# Patient Record
Sex: FEMALE | Race: WHITE | Employment: OTHER | RURAL
[De-identification: names, ages, dates, MRNs, and addresses within clinical notes are randomized per-mention and may not be internally consistent; named-entity substitution may affect disease eponyms.]

---

## 2021-08-08 ENCOUNTER — APPOINTMENT (OUTPATIENT)
Dept: GENERAL RADIOLOGY | Age: 71
DRG: 064 | End: 2021-08-08
Attending: INTERNAL MEDICINE
Payer: MEDICARE

## 2021-08-08 ENCOUNTER — APPOINTMENT (OUTPATIENT)
Dept: CT IMAGING | Age: 71
DRG: 064 | End: 2021-08-08
Attending: EMERGENCY MEDICINE
Payer: MEDICARE

## 2021-08-08 ENCOUNTER — HOSPITAL ENCOUNTER (INPATIENT)
Age: 71
LOS: 2 days | Discharge: HOME | DRG: 064 | End: 2021-08-12
Attending: EMERGENCY MEDICINE | Admitting: INTERNAL MEDICINE
Payer: MEDICARE

## 2021-08-08 DIAGNOSIS — U07.1 COVID-19 VIRUS INFECTION: ICD-10-CM

## 2021-08-08 DIAGNOSIS — E78.5 DYSLIPIDEMIA: ICD-10-CM

## 2021-08-08 DIAGNOSIS — I63.312 CEREBROVASCULAR ACCIDENT (CVA) DUE TO THROMBOSIS OF LEFT MIDDLE CEREBRAL ARTERY (HCC): ICD-10-CM

## 2021-08-08 DIAGNOSIS — G45.9 TIA (TRANSIENT ISCHEMIC ATTACK): Primary | ICD-10-CM

## 2021-08-08 DIAGNOSIS — F17.200 SMOKING: ICD-10-CM

## 2021-08-08 LAB
ALBUMIN SERPL-MCNC: 3.2 G/DL (ref 3.5–5)
ALBUMIN/GLOB SERPL: 0.7 {RATIO} (ref 1.1–2.2)
ALP SERPL-CCNC: 106 U/L (ref 45–117)
ALT SERPL-CCNC: 32 U/L (ref 12–78)
ANION GAP SERPL CALC-SCNC: 10 MMOL/L (ref 5–15)
AST SERPL-CCNC: 37 U/L (ref 15–37)
ATRIAL RATE: 74 BPM
BASOPHILS # BLD: 0.1 K/UL (ref 0–0.1)
BASOPHILS NFR BLD: 1 % (ref 0–1)
BILIRUB SERPL-MCNC: 0.8 MG/DL (ref 0.2–1)
BUN SERPL-MCNC: 15 MG/DL (ref 6–20)
BUN/CREAT SERPL: 21 (ref 12–20)
CALCIUM SERPL-MCNC: 9 MG/DL (ref 8.5–10.1)
CALCULATED P AXIS, ECG09: 65 DEGREES
CALCULATED R AXIS, ECG10: -12 DEGREES
CALCULATED T AXIS, ECG11: 53 DEGREES
CHLORIDE SERPL-SCNC: 101 MMOL/L (ref 97–108)
CO2 SERPL-SCNC: 23 MMOL/L (ref 21–32)
CREAT SERPL-MCNC: 0.73 MG/DL (ref 0.55–1.02)
D DIMER PPP FEU-MCNC: 11.47 MG/L FEU (ref 0–0.65)
DIAGNOSIS, 93000: NORMAL
DIFFERENTIAL METHOD BLD: ABNORMAL
EOSINOPHIL # BLD: 0.1 K/UL (ref 0–0.4)
EOSINOPHIL NFR BLD: 1 % (ref 0–7)
ERYTHROCYTE [DISTWIDTH] IN BLOOD BY AUTOMATED COUNT: 12.4 % (ref 11.5–14.5)
FLUAV RNA SPEC QL NAA+PROBE: NOT DETECTED
FLUBV RNA SPEC QL NAA+PROBE: NOT DETECTED
GLOBULIN SER CALC-MCNC: 4.5 G/DL (ref 2–4)
GLUCOSE BLD STRIP.AUTO-MCNC: 109 MG/DL (ref 65–117)
GLUCOSE SERPL-MCNC: 107 MG/DL (ref 65–100)
HCT VFR BLD AUTO: 46 % (ref 35–47)
HGB BLD-MCNC: 16.4 G/DL (ref 11.5–16)
IMM GRANULOCYTES # BLD AUTO: 0 K/UL (ref 0–0.04)
IMM GRANULOCYTES NFR BLD AUTO: 0 % (ref 0–0.5)
INR PPP: 1.1 (ref 0.9–1.1)
LYMPHOCYTES # BLD: 1.8 K/UL (ref 0.8–3.5)
LYMPHOCYTES NFR BLD: 27 % (ref 12–49)
MCH RBC QN AUTO: 33.2 PG (ref 26–34)
MCHC RBC AUTO-ENTMCNC: 35.7 G/DL (ref 30–36.5)
MCV RBC AUTO: 93.1 FL (ref 80–99)
MONOCYTES # BLD: 0.9 K/UL (ref 0–1)
MONOCYTES NFR BLD: 13 % (ref 5–13)
NEUTS SEG # BLD: 4 K/UL (ref 1.8–8)
NEUTS SEG NFR BLD: 58 % (ref 32–75)
NRBC # BLD: 0 K/UL (ref 0–0.01)
NRBC BLD-RTO: 0 PER 100 WBC
P-R INTERVAL, ECG05: 170 MS
PLATELET # BLD AUTO: 288 K/UL (ref 150–400)
PMV BLD AUTO: 9.4 FL (ref 8.9–12.9)
POTASSIUM SERPL-SCNC: 4 MMOL/L (ref 3.5–5.1)
PROT SERPL-MCNC: 7.7 G/DL (ref 6.4–8.2)
PROTHROMBIN TIME: 10.9 SEC (ref 9–11.1)
Q-T INTERVAL, ECG07: 392 MS
QRS DURATION, ECG06: 84 MS
QTC CALCULATION (BEZET), ECG08: 435 MS
RBC # BLD AUTO: 4.94 M/UL (ref 3.8–5.2)
SARS-COV-2, COV2: DETECTED
SERVICE CMNT-IMP: NORMAL
SODIUM SERPL-SCNC: 134 MMOL/L (ref 136–145)
TROPONIN I SERPL-MCNC: <0.05 NG/ML
VENTRICULAR RATE, ECG03: 74 BPM
WBC # BLD AUTO: 6.8 K/UL (ref 3.6–11)

## 2021-08-08 PROCEDURE — 80053 COMPREHEN METABOLIC PANEL: CPT

## 2021-08-08 PROCEDURE — 85025 COMPLETE CBC W/AUTO DIFF WBC: CPT

## 2021-08-08 PROCEDURE — 71045 X-RAY EXAM CHEST 1 VIEW: CPT

## 2021-08-08 PROCEDURE — 99218 HC RM OBSERVATION: CPT

## 2021-08-08 PROCEDURE — 82962 GLUCOSE BLOOD TEST: CPT

## 2021-08-08 PROCEDURE — 36415 COLL VENOUS BLD VENIPUNCTURE: CPT

## 2021-08-08 PROCEDURE — 85379 FIBRIN DEGRADATION QUANT: CPT

## 2021-08-08 PROCEDURE — 87636 SARSCOV2 & INF A&B AMP PRB: CPT

## 2021-08-08 PROCEDURE — 96372 THER/PROPH/DIAG INJ SC/IM: CPT

## 2021-08-08 PROCEDURE — 74011250637 HC RX REV CODE- 250/637: Performed by: EMERGENCY MEDICINE

## 2021-08-08 PROCEDURE — 99285 EMERGENCY DEPT VISIT HI MDM: CPT

## 2021-08-08 PROCEDURE — 84484 ASSAY OF TROPONIN QUANT: CPT

## 2021-08-08 PROCEDURE — 70450 CT HEAD/BRAIN W/O DYE: CPT

## 2021-08-08 PROCEDURE — 74011250636 HC RX REV CODE- 250/636: Performed by: INTERNAL MEDICINE

## 2021-08-08 PROCEDURE — 93005 ELECTROCARDIOGRAM TRACING: CPT

## 2021-08-08 PROCEDURE — 85610 PROTHROMBIN TIME: CPT

## 2021-08-08 RX ORDER — ENOXAPARIN SODIUM 100 MG/ML
40 INJECTION SUBCUTANEOUS EVERY 12 HOURS
Status: DISCONTINUED | OUTPATIENT
Start: 2021-08-08 | End: 2021-08-12 | Stop reason: HOSPADM

## 2021-08-08 RX ORDER — ONDANSETRON 2 MG/ML
4 INJECTION INTRAMUSCULAR; INTRAVENOUS
Status: DISCONTINUED | OUTPATIENT
Start: 2021-08-08 | End: 2021-08-12 | Stop reason: HOSPADM

## 2021-08-08 RX ORDER — GUAIFENESIN 100 MG/5ML
325 LIQUID (ML) ORAL
Status: COMPLETED | OUTPATIENT
Start: 2021-08-08 | End: 2021-08-08

## 2021-08-08 RX ORDER — ACETAMINOPHEN 325 MG/1
650 TABLET ORAL
Status: DISCONTINUED | OUTPATIENT
Start: 2021-08-08 | End: 2021-08-12 | Stop reason: HOSPADM

## 2021-08-08 RX ADMIN — ASPIRIN 324 MG: 81 TABLET, CHEWABLE ORAL at 17:04

## 2021-08-08 RX ADMIN — ENOXAPARIN SODIUM 40 MG: 40 INJECTION SUBCUTANEOUS at 19:49

## 2021-08-08 NOTE — ED PROVIDER NOTES
[address] [facility]  EMERGENCY DEPARTMENT HISTORY AND PHYSICAL EXAM         Date of Service: 8/8/2021   Patient Name: Wali Lu   YOB: 1950  Medical Record Number: [id number]    History of Presenting Illness     Chief Complaint   Patient presents with    Extremity Weakness        History Provided By:  patient    Additional History:   Wali Lu is a 73 y.o. female who presents via EMS to the ED with cc of right side weakness that began about 3 hours PTA, lasted about 20 minutes, and has now resolved. She denies headache, difficulty speaking, facial droop. No H/O stroke. She denies CP, palpitations, SOB. She has been coughing, but has no F/C. She notes that all of her household members have [employer] and she is not vaccinated. There are no other complaints, changes or physical findings at this time. Primary Care Provider: None   Specialist:    Past History     Past Medical History:   History reviewed. No pertinent past medical history. Past Surgical History:   History reviewed. No pertinent surgical history. Family History:   History reviewed. No pertinent family history. Social History:   Social History     Tobacco Use    Smoking status: Not on file   Substance Use Topics    Alcohol use: Not on file    Drug use: Not on file        Allergies:   No Known Allergies     Review of Systems   Review of Systems   Constitutional: Negative for appetite change, chills and fever. HENT: Negative for congestion. Eyes: Negative for visual disturbance. Respiratory: Positive for cough. Negative for shortness of breath and wheezing. Cardiovascular: Negative for chest pain, palpitations and leg swelling. Gastrointestinal: Negative for abdominal pain. Genitourinary: Negative for dysuria, frequency and urgency. Musculoskeletal: Negative for back pain, joint swelling, myalgias and neck stiffness. Skin: Negative for rash. Neurological: Positive for weakness. Negative for dizziness, syncope, facial asymmetry, speech difficulty, numbness and headaches. Hematological: Negative for adenopathy. Psychiatric/Behavioral: Negative for behavioral problems and dysphoric mood. Physical Exam  Physical Exam  Vitals and nursing note reviewed. Constitutional:       General: She is not in acute distress. Appearance: She is well-developed. HENT:      Head: Normocephalic and atraumatic. Eyes:      General: No scleral icterus. Conjunctiva/sclera: Conjunctivae normal.      Pupils: Pupils are equal, round, and reactive to light. Cardiovascular:      Rate and Rhythm: Normal rate and regular rhythm. Heart sounds: No murmur heard. No gallop. Pulmonary:      Effort: Pulmonary effort is normal. No respiratory distress. Breath sounds: No stridor. No wheezing or rales. Abdominal:      General: Bowel sounds are normal. There is no distension. Palpations: Abdomen is soft. There is no mass. Tenderness: There is no abdominal tenderness. There is no guarding or rebound. Musculoskeletal:         General: Normal range of motion. Cervical back: Normal range of motion and neck supple. Lymphadenopathy:      Cervical: No cervical adenopathy. Skin:     General: Skin is warm and dry. Findings: No erythema or rash. Neurological:      General: No focal deficit present. Mental Status: She is alert and oriented to person, place, and time. Cranial Nerves: No cranial nerve deficit. Sensory: No sensory deficit. Motor: No weakness. Coordination: Coordination normal.      Comments: Pt has an entirely non focal neuro exam.          Medical Decision Making   I am the first provider for this patient. I reviewed the vital signs, available nursing notes, past medical history, past surgical history, family history and social history.      Old Medical Records: none     Provider Notes:   DDX: TIA, CVA, bleed, [facility]     ED Course:  5:50 PM   Initial assessment performed. The patients presenting problems have been discussed, and they are in agreement with the care plan formulated and outlined with them. I have encouraged them to ask questions as they arise throughout their visit. Progress Notes:  6:35 PM  Unremarkable workup, with negative CT head. COVID positive. Will admit for new onset TIA. Pb Baker., MD      Procedures:   Procedures    Diagnostic Study Results   Labs -      Recent Results (from the past 12 hour(s))   CBC WITH AUTOMATED DIFF    Collection Time: 08/08/21  4:47 PM   Result Value Ref Range    WBC 6.8 3.6 - 11.0 K/uL    RBC 4.94 3.80 - 5.20 M/uL    HGB 16.4 (H) 11.5 - 16.0 g/dL    HCT 46.0 35.0 - 47.0 %    MCV 93.1 80.0 - 99.0 FL    MCH 33.2 26.0 - 34.0 PG    MCHC 35.7 30.0 - 36.5 g/dL    RDW 12.4 11.5 - 14.5 %    PLATELET 357 609 - 247 K/uL    MPV 9.4 8.9 - 12.9 FL    NRBC 0.0 0  WBC    ABSOLUTE NRBC 0.00 0.00 - 0.01 K/uL    NEUTROPHILS 58 32 - 75 %    LYMPHOCYTES 27 12 - 49 %    MONOCYTES 13 5 - 13 %    EOSINOPHILS 1 0 - 7 %    BASOPHILS 1 0 - 1 %    IMMATURE GRANULOCYTES 0 0.0 - 0.5 %    ABS. NEUTROPHILS 4.0 1.8 - 8.0 K/UL    ABS. LYMPHOCYTES 1.8 0.8 - 3.5 K/UL    ABS. MONOCYTES 0.9 0.0 - 1.0 K/UL    ABS. EOSINOPHILS 0.1 0.0 - 0.4 K/UL    ABS. BASOPHILS 0.1 0.0 - 0.1 K/UL    ABS. IMM.  GRANS. 0.0 0.00 - 0.04 K/UL    DF AUTOMATED     METABOLIC PANEL, COMPREHENSIVE    Collection Time: 08/08/21  4:47 PM   Result Value Ref Range    Sodium 134 (L) 136 - 145 mmol/L    Potassium 4.0 3.5 - 5.1 mmol/L    Chloride 101 97 - 108 mmol/L    CO2 23 21 - 32 mmol/L    Anion gap 10 5 - 15 mmol/L    Glucose 107 (H) 65 - 100 mg/dL    BUN 15 6 - 20 MG/DL    Creatinine 0.73 0.55 - 1.02 MG/DL    BUN/Creatinine ratio 21 (H) 12 - 20      GFR est AA >60 >60 ml/min/1.73m2    GFR est non-AA >60 >60 ml/min/1.73m2    Calcium 9.0 8.5 - 10.1 MG/DL    Bilirubin, total 0.8 0.2 - 1.0 MG/DL    ALT (SGPT) 32 12 - 78 U/L    AST (SGOT) 37 15 - 37 U/L    Alk. phosphatase 106 45 - 117 U/L    Protein, total 7.7 6.4 - 8.2 g/dL    Albumin 3.2 (L) 3.5 - 5.0 g/dL    Globulin 4.5 (H) 2.0 - 4.0 g/dL    A-G Ratio 0.7 (L) 1.1 - 2.2     PROTHROMBIN TIME + INR    Collection Time: 08/08/21  4:47 PM   Result Value Ref Range    INR 1.1 0.9 - 1.1      Prothrombin time 10.9 9.0 - 11.1 sec   TROPONIN I    Collection Time: 08/08/21  4:47 PM   Result Value Ref Range    Troponin-I, Qt. <0.05 <0.05 ng/mL   EKG, 12 LEAD, INITIAL    Collection Time: 08/08/21  4:59 PM   Result Value Ref Range    Ventricular Rate 74 BPM    Atrial Rate 74 BPM    P-R Interval 170 ms    QRS Duration 84 ms    Q-T Interval 392 ms    QTC Calculation (Bezet) 435 ms    Calculated P Axis 65 degrees    Calculated R Axis -12 degrees    Calculated T Axis 53 degrees    Diagnosis       Normal sinus rhythm  Normal ECG  No previous ECGs available     GLUCOSE, POC    Collection Time: 08/08/21  5:01 PM   Result Value Ref Range    Glucose (POC) 109 65 - 117 mg/dL    Performed by Iveth Freed RN    COVID-19 WITH INFLUENZA A/B    Collection Time: 08/08/21  5:17 PM   Result Value Ref Range    SARS-CoV-2 Detected (AA) NOTD      Influenza A by PCR Not detected NOTD      Influenza B by PCR Not detected NOTD         Radiologic Studies -  The following have been ordered and reviewed:  CT HEAD WO CONT   Final Result   No acute intracranial abnormality. CT Results  (Last 48 hours)               08/08/21 1759  CT HEAD WO CONT Final result    Impression:  No acute intracranial abnormality. Narrative:  HEAD CT WITHOUT CONTRAST: 8/8/2021 5:59 PM       INDICATION: Right-sided weakness, now resolved. COMPARISON: None. PROCEDURE: Axial images of the head were obtained without contrast. Coronal and   sagittal reformats were performed. CT dose reduction was achieved through use of   a standardized protocol tailored for this examination and automatic exposure   control for dose modulation. FINDINGS: The ventricles and sulci are appropriate in size and configuration for   age. No loss of gray-white differentiation to suggest late acute or early   subacute infarction. No mass effect or intracranial hemorrhage. There are air-fluid levels in the left maxillary, left ethmoid, and bilateral   sphenoid sinuses. CXR Results  (Last 48 hours)    None            Vital Signs-Reviewed the patient's vital signs. Patient Vitals for the past 12 hrs:   Temp Pulse Resp BP SpO2   08/08/21 1635 99 °F (37.2 °C) 80 18 (!) 144/75 95 %     Medications Given in the ED:  Medications   aspirin chewable tablet 324 mg (324 mg Oral Given 8/8/21 1704)       Diagnosis:  Clinical Impression:   1. TIA (transient ischemic attack)    2. COVID-19 virus infection         Disposition:  Home  _______________________________   Attestations: This note was performed by Candi Doss MD in its entirety.   _______________________________

## 2021-08-08 NOTE — ED NOTES
Verbal shift change report given to Hawk Arceo RN (oncoming nurse) by Lou Martines RN (offgoing nurse).  Report included the following information SBAR, ED Summary, MAR, Med Rec Status and Cardiac Rhythm Sr.

## 2021-08-08 NOTE — H&P
[facility]   Admission History & Physical        2021 7:06 PM  Patient: Elizabeth Olvera 1950  PCP: None    HISTORY  Chief Complaint:   Chief Complaint   Patient presents with    Extremity Weakness       HPI: 73 y.o. female presenting for admission to [facility] for further evaluation and treatment for TIA (transient ischemic attack). She  has no past medical history on file. . She reported the onset of R UE/LE weakness 3 hours PTA lasting about 20 minutes getting up from bed where she was resting watching a movie. She notes her hand dropped, legs were week and she fell to ground. Has h/o Cigs / HTN / HDL. No h/o DM. Pt is a retired . No prior h/o vascular disease. No significant COPD    Moved to [facility] in March to be close to her sister (from [place]). Nephew came up from [facility] for his birthday and became ill and was later dx with COVID. His father was admitted earlier with Covid Pneum. Pt has been ill with cough, weakness, anorexia, no taste, extreme weakness. Pt denies any focal weakness until this afternoon PTA. She has note some cough, but no SOB / wheezing. Has never required bronchodilator or oxygen. ED w/u was noted for neg CT HEAD. Pt was admitted to [facility] for Vascular w/u     Past Medical History:  HTN  HDL    Past Surgical History:  None      Medication: (has not taken the past week due to illness)  Norvasc 5mg daily  Pravachol 20mg daily    Allergies:  No Known Allergies    Social History:  Cigarette:   1ppd x 40yrs, none the past week  EtOH:  Wine daily, none the past week    Family History:  History reviewed. No pertinent family history.     ROS:  Total of 12 systems reviewed as follows:  POSITIVE= bolded text  Negative = text not bolded       General:  fever, chills, sweats, generalized weakness, weight loss/gain, loss of appetite   Eyes:    blurred vision, eye pain, loss of vision, double vision  ENT:    rhinorrhea, pharyngitis   Respiratory:  cough, sputum production, SOB, INGRAM, wheezing, pleuritic pain   Cardiology:   chest pain, palpitations, orthopnea, PND, edema, syncope   Gastrointestinal:  abdominal pain , N/V, diarrhea, dysphagia, constipation, bleeding   Genitourinary:  frequency, urgency, dysuria, hematuria, incontinence, prostatism   Muskuloskeletal: arthralgia, myalgia, back pain  Hematology:   easy bruising, nose or gum bleeding, lymphadenopathy   Dermatological: rash, ulceration, pruritis, color change / jaundice  Endocrine:   hot flashes or polydipsia   Neurological:  headache, dizziness, confusion, focal weakness, paresthesia, speech difficulties, memory loss, gait difficulty  Psychological: feelings of anxiety, depression, agitation      PHYSICAL EXAM:  Patient Vitals for the past 24 hrs:   Temp Pulse Resp BP SpO2   08/08/21 1840  73 18 121/71 93 %   08/08/21 1741  75 18 (!) 173/87 93 %   08/08/21 1635 99 °F (37.2 °C) 80 18 (!) 144/75 95 %       General:    Alert, cooperative, no distress, appears stated age. HEENT: Atraumatic, anicteric sclerae, pink conjunctivae     No oral ulcers, mucosa moist, throat clear, dentition fair  Neck:  Supple, symmetrical;   thyroid non tender  Lungs:   Clear to auscultation bilaterally. No wheezing or rhonchi. No rales. Chest wall:  No tenderness. No accessory muscle use. Heart:   Regular rhythm. No  murmur. No edema  Abdomen:   Soft, non-tender. Not distended. Bowel sounds normal  Extremities: No cyanosis. No clubbing      Capillary refill normal,  Radial pulse 2+,  DP 1+  Skin:     Not pale. Not jaundiced. No rashes   Psych:  Not depressed. Not anxious or agitated. Neurologic: EOMs intact. No facial asymmetry. No aphasia or slurred speech. Mild weakness R  4+/5 w/o drift, Sensation grossly intact. Alert and oriented X 4.     Lab Data Reviewed:    Recent Results (from the past 24 hour(s))   CBC WITH AUTOMATED DIFF    Collection Time: 08/08/21  4:47 PM   Result Value Ref Range    WBC 6.8 3.6 - 11.0 K/uL    RBC 4.94 3.80 - 5.20 M/uL    HGB 16.4 (H) 11.5 - 16.0 g/dL    HCT 46.0 35.0 - 47.0 %    MCV 93.1 80.0 - 99.0 FL    MCH 33.2 26.0 - 34.0 PG    MCHC 35.7 30.0 - 36.5 g/dL    RDW 12.4 11.5 - 14.5 %    PLATELET 561 984 - 472 K/uL    MPV 9.4 8.9 - 12.9 FL    NRBC 0.0 0  WBC    ABSOLUTE NRBC 0.00 0.00 - 0.01 K/uL    NEUTROPHILS 58 32 - 75 %    LYMPHOCYTES 27 12 - 49 %    MONOCYTES 13 5 - 13 %    EOSINOPHILS 1 0 - 7 %    BASOPHILS 1 0 - 1 %    IMMATURE GRANULOCYTES 0 0.0 - 0.5 %    ABS. NEUTROPHILS 4.0 1.8 - 8.0 K/UL    ABS. LYMPHOCYTES 1.8 0.8 - 3.5 K/UL    ABS. MONOCYTES 0.9 0.0 - 1.0 K/UL    ABS. EOSINOPHILS 0.1 0.0 - 0.4 K/UL    ABS. BASOPHILS 0.1 0.0 - 0.1 K/UL    ABS. IMM. GRANS. 0.0 0.00 - 0.04 K/UL    DF AUTOMATED     METABOLIC PANEL, COMPREHENSIVE    Collection Time: 08/08/21  4:47 PM   Result Value Ref Range    Sodium 134 (L) 136 - 145 mmol/L    Potassium 4.0 3.5 - 5.1 mmol/L    Chloride 101 97 - 108 mmol/L    CO2 23 21 - 32 mmol/L    Anion gap 10 5 - 15 mmol/L    Glucose 107 (H) 65 - 100 mg/dL    BUN 15 6 - 20 MG/DL    Creatinine 0.73 0.55 - 1.02 MG/DL    BUN/Creatinine ratio 21 (H) 12 - 20      GFR est AA >60 >60 ml/min/1.73m2    GFR est non-AA >60 >60 ml/min/1.73m2    Calcium 9.0 8.5 - 10.1 MG/DL    Bilirubin, total 0.8 0.2 - 1.0 MG/DL    ALT (SGPT) 32 12 - 78 U/L    AST (SGOT) 37 15 - 37 U/L    Alk.  phosphatase 106 45 - 117 U/L    Protein, total 7.7 6.4 - 8.2 g/dL    Albumin 3.2 (L) 3.5 - 5.0 g/dL    Globulin 4.5 (H) 2.0 - 4.0 g/dL    A-G Ratio 0.7 (L) 1.1 - 2.2     PROTHROMBIN TIME + INR    Collection Time: 08/08/21  4:47 PM   Result Value Ref Range    INR 1.1 0.9 - 1.1      Prothrombin time 10.9 9.0 - 11.1 sec   TROPONIN I    Collection Time: 08/08/21  4:47 PM   Result Value Ref Range    Troponin-I, Qt. <0.05 <0.05 ng/mL   EKG, 12 LEAD, INITIAL    Collection Time: 08/08/21  4:59 PM   Result Value Ref Range    Ventricular Rate 74 BPM    Atrial Rate 74 BPM    P-R Interval 170 ms QRS Duration 84 ms    Q-T Interval 392 ms    QTC Calculation (Bezet) 435 ms    Calculated P Axis 65 degrees    Calculated R Axis -12 degrees    Calculated T Axis 53 degrees    Diagnosis       Normal sinus rhythm  Normal ECG  No previous ECGs available     GLUCOSE, POC    Collection Time: 08/08/21  5:01 PM   Result Value Ref Range    Glucose (POC) 109 65 - 117 mg/dL    Performed by Pamela Hadley RN    COVID-19 WITH INFLUENZA A/B    Collection Time: 08/08/21  5:17 PM   Result Value Ref Range    SARS-CoV-2 Detected (AA) NOTD      Influenza A by PCR Not detected NOTD      Influenza B by PCR Not detected NOTD         EKG: NSR 74 bpm, Normal    Radiology:  CXR 8/9:  Heterogeneous airspace opacities bilaterally. No pleural effusion or  pneumothorax. Heart size and mediastinal contours are normal. Osseous structures  are within normal limits.   IMPRESSION : Heterogeneous bilateral airspace opacities in keeping with COVID 19 infection. CT HEAD 8/8:  The ventricles and sulci are appropriate in size and configuration for  age. No loss of gray-white differentiation to suggest late acute or early  subacute infarction. No mass effect or intracranial hemorrhage.   There are air-fluid levels in the left maxillary, left ethmoid, and bilateral sphenoid sinuses.   IMPRESSION:  No acute intracranial abnormality. MRI BRAIN 8/9:  There are multiple small acute infarcts in the left frontal and parietal lobes,  including in the parasagittal posterior left frontal lobe, involving both the  precentral and postcentral gyri, and in the left lateral frontal lobe.   The ventricles are normal in size and position. There are scattered  periventricular and deep white matter T2/FLAIR hyperintensities, consistent with  mild chronic microvascular ischemic disease. There is no acute hemorrhage,  extra-axial fluid collection, or mass effect. There is no cerebellar tonsillar  herniation.  Expected arterial flow-voids are present.   Scattered mucosal thickening and air-fluid levels throughout the paranasal  sinuses, most pronounced in the bilateral sphenoid sinuses, bilateral ethmoidal  air cells and left maxillary sinus. The mastoid air cells and middle ears are  clear. The orbital contents are within normal limits. No significant osseous or  scalp lesions are identified.   IMPRESSION  1. Multiple small scattered acute infarcts in the left frontal and parietal lobes. 2. Mild chronic microvascular ischemic disease. 3. Pansinus mucosal thickening with scattered air-fluid levels, consistent with acute sinusitis.     ECHO 8/9:  · LV: Estimated LVEF is 65 - 70%. Visually measured ejection fraction. Normal cavity size, systolic function (ejection fraction normal) and diastolic function. Upper normal wall thickness. Age-appropriate left ventricular diastolic function. CAROTID DUPLEX 8/9:  Minimal stenosis of the right ICA. Intimal thickening. Mild (<50%) stenosis of the left ICA. Mild, heterogeneous and calcific plaque. EKG 8/8: NSR 74 bpm      Care Plan discussed with:   Patient x    Family     RN x     x    Consultant      Expected  Disposition:   Home with Family x   HH/PT/OT/RN    SNF/LTC    JC      TOTAL TIME:  61 Minutes      Comments    x Reviewed previous records   >50% of visit spent in counseling and coordination of care x Discussion with patient and/or family and questions answered       _______________________________________________________  Given the patient's current clinical presentation, I have a high level of concern for decompensation if discharged from the emergency department. Complex decision making was performed, which includes reviewing the patient's available past medical records, laboratory results, and x-ray films. My assessment of this patient's clinical condition and my plan of care is as follows.     ASSESSMENT / PLAN    Principal Problem:    TIA (transient ischemic attack) (8/8/2021)     L Cerebral (Frontal / Parietal) CVA  20 min h/o R ext weakness / numbness   No prior h/o vascular disease  Has been home in [place] - suspected positive follow exposure to nephew from [facility] (whole family ill)  Concern with elevated D-dimer 11 range  Have started on Lovenox 40mg SQ q12  Begin ASA 81mg daily   Cont tx Statin - currently on Pravachol  Lipids in AM    Active Problems:    COVID-19 (8/8/2021)  No respiratory symptoms  CXR c/w B viral pneumonia  Begin Solumedrol 6mg daily for 10 days  Lovenox bid, confer with Neuro best tx  AM labs to include CRP, Ferritin, BMP, CBC, LD  Monitor temp / oximetry  Droplet plus isolation for now      Lipids  Cont Pravachol      HTN  BP good, hold Norvasc for now    SAFETY:   Code Status:Full  DVT prophylaxis:Lovenox bid  Stress Ulcer prophylaxis: Pepcid  Bladder catheter:no  Family Contact Info:  Primary Emergency ContactLanildailene Quesada Sage Phone: [phone]  Bedded: [facility] Room ED08/08  Disposition: TBD, likely home when stable  Admission status:  Observation    -Tentative plan of care discussed with patient / family, who demonstrated understanding and is in agreement to the above  -Case was reviewed with the ED Provider, MD Flores Velasco MD  [facility] Hospitalist  [phone]

## 2021-08-08 NOTE — Clinical Note
Status[de-identified] INPATIENT [101]   Type of Bed: Medical [8]   Inpatient Hospitalization Certified Necessary for the Following Reasons: 3.  Patient receiving treatment that can only be provided in an inpatient setting (further clarification in H&P documentation)   Admitting Diagnosis: TIA (transient ischemic attack) [328808]   Admitting Physician: Jean Beltran [[id number]]   Attending Physician: Jean Beltran [[id number]]   Estimated Length of Stay: < 96 Hours   Discharge Plan[de-identified] Home with Office Follow-up

## 2021-08-09 ENCOUNTER — APPOINTMENT (OUTPATIENT)
Dept: ULTRASOUND IMAGING | Age: 71
DRG: 064 | End: 2021-08-09
Attending: INTERNAL MEDICINE
Payer: MEDICARE

## 2021-08-09 ENCOUNTER — APPOINTMENT (OUTPATIENT)
Dept: MRI IMAGING | Age: 71
DRG: 064 | End: 2021-08-09
Attending: INTERNAL MEDICINE
Payer: MEDICARE

## 2021-08-09 LAB
ALBUMIN SERPL-MCNC: 2.8 G/DL (ref 3.5–5)
ALBUMIN/GLOB SERPL: 0.7 {RATIO} (ref 1.1–2.2)
ALP SERPL-CCNC: 95 U/L (ref 45–117)
ALT SERPL-CCNC: 28 U/L (ref 12–78)
ANION GAP SERPL CALC-SCNC: 9 MMOL/L (ref 5–15)
ANION GAP SERPL CALC-SCNC: 9 MMOL/L (ref 5–15)
AST SERPL-CCNC: 34 U/L (ref 15–37)
BASOPHILS # BLD: 0.1 K/UL (ref 0–0.1)
BASOPHILS NFR BLD: 1 % (ref 0–1)
BILIRUB SERPL-MCNC: 0.7 MG/DL (ref 0.2–1)
BUN SERPL-MCNC: 12 MG/DL (ref 6–20)
BUN SERPL-MCNC: 13 MG/DL (ref 6–20)
BUN/CREAT SERPL: 20 (ref 12–20)
BUN/CREAT SERPL: 22 (ref 12–20)
CALCIUM SERPL-MCNC: 8.6 MG/DL (ref 8.5–10.1)
CALCIUM SERPL-MCNC: 8.6 MG/DL (ref 8.5–10.1)
CHLORIDE SERPL-SCNC: 103 MMOL/L (ref 97–108)
CHLORIDE SERPL-SCNC: 103 MMOL/L (ref 97–108)
CHOLEST SERPL-MCNC: 203 MG/DL
CO2 SERPL-SCNC: 25 MMOL/L (ref 21–32)
CO2 SERPL-SCNC: 25 MMOL/L (ref 21–32)
COMMENT, HOLDF: NORMAL
CREAT SERPL-MCNC: 0.59 MG/DL (ref 0.55–1.02)
CREAT SERPL-MCNC: 0.59 MG/DL (ref 0.55–1.02)
DIFFERENTIAL METHOD BLD: ABNORMAL
ECHO AO ROOT DIAM: 3.43 CM
ECHO AV PEAK GRADIENT: 5.27 MMHG
ECHO AV PEAK VELOCITY: 114.74 CM/S
ECHO EST RA PRESSURE: 10 MMHG
ECHO LA MAJOR AXIS: 3.17 CM
ECHO LA MINOR AXIS: 1.83 CM
ECHO LV E' SEPTAL VELOCITY: 8.68 CM/S
ECHO LV INTERNAL DIMENSION DIASTOLIC: 4.28 CM (ref 3.9–5.3)
ECHO LV INTERNAL DIMENSION SYSTOLIC: 2.64 CM
ECHO LV IVSD: 1.18 CM (ref 0.6–0.9)
ECHO LV MASS 2D: 168.1 G (ref 67–162)
ECHO LV MASS INDEX 2D: 97.2 G/M2 (ref 43–95)
ECHO LV POSTERIOR WALL DIASTOLIC: 1.08 CM (ref 0.6–0.9)
ECHO LVOT DIAM: 1.98 CM
ECHO MV A VELOCITY: 52.4 CM/S
ECHO MV E DECELERATION TIME (DT): 252.8 MS
ECHO MV E VELOCITY: 53.23 CM/S
ECHO MV E/A RATIO: 1.02
ECHO MV E/E' SEPTAL: 6.13
ECHO TV REGURGITANT MAX VELOCITY: 69.08 CM/S
EOSINOPHIL # BLD: 0.1 K/UL (ref 0–0.4)
EOSINOPHIL NFR BLD: 2 % (ref 0–7)
ERYTHROCYTE [DISTWIDTH] IN BLOOD BY AUTOMATED COUNT: 12.4 % (ref 11.5–14.5)
FERRITIN SERPL-MCNC: 1303 NG/ML (ref 8–252)
GLOBULIN SER CALC-MCNC: 4.3 G/DL (ref 2–4)
GLUCOSE SERPL-MCNC: 103 MG/DL (ref 65–100)
GLUCOSE SERPL-MCNC: 106 MG/DL (ref 65–100)
HCT VFR BLD AUTO: 41.6 % (ref 35–47)
HDLC SERPL-MCNC: 36 MG/DL
HDLC SERPL: 5.6 {RATIO} (ref 0–5)
HGB BLD-MCNC: 15.2 G/DL (ref 11.5–16)
IMM GRANULOCYTES # BLD AUTO: 0 K/UL (ref 0–0.04)
IMM GRANULOCYTES NFR BLD AUTO: 0 % (ref 0–0.5)
LDLC SERPL CALC-MCNC: 143.8 MG/DL (ref 0–100)
LEFT CCA DIST DIAS: 12.2 CENTIMETER/SECOND
LEFT CCA DIST SYS: 55.4 CENTIMETER/SECOND
LEFT CCA PROX DIAS: 12.7 CENTIMETER/SECOND
LEFT CCA PROX SYS: 65.9 CENTIMETER/SECOND
LEFT ECA DIAS: 6.78 CENTIMETER/SECOND
LEFT ECA SYS: 62.6 CENTIMETER/SECOND
LEFT ICA DIST DIAS: 22.4 CENTIMETER/SECOND
LEFT ICA DIST SYS: 61.9 CENTIMETER/SECOND
LEFT ICA MID DIAS: 18 CENTIMETER/SECOND
LEFT ICA MID SYS: 57.2 CENTIMETER/SECOND
LEFT ICA PROX DIAS: 19 CM/S
LEFT ICA PROX SYS: 68 CM/S
LEFT ICA/CCA SYS: 1.04
LEFT VERTEBRAL DIAS: 11.13 CENTIMETER/SECOND
LEFT VERTEBRAL DIAS: 8.6 CENTIMETER/SECOND
LEFT VERTEBRAL SYS: 34.7 CENTIMETER/SECOND
LEFT VERTEBRAL SYS: 38.3 CENTIMETER/SECOND
LYMPHOCYTES # BLD: 1.6 K/UL (ref 0.8–3.5)
LYMPHOCYTES NFR BLD: 31 % (ref 12–49)
MCH RBC QN AUTO: 33.8 PG (ref 26–34)
MCHC RBC AUTO-ENTMCNC: 36.5 G/DL (ref 30–36.5)
MCV RBC AUTO: 92.4 FL (ref 80–99)
MONOCYTES # BLD: 0.8 K/UL (ref 0–1)
MONOCYTES NFR BLD: 16 % (ref 5–13)
NEUTS SEG # BLD: 2.5 K/UL (ref 1.8–8)
NEUTS SEG NFR BLD: 50 % (ref 32–75)
NRBC # BLD: 0 K/UL (ref 0–0.01)
NRBC BLD-RTO: 0 PER 100 WBC
PLATELET # BLD AUTO: 332 K/UL (ref 150–400)
PMV BLD AUTO: 9.7 FL (ref 8.9–12.9)
POTASSIUM SERPL-SCNC: 3.7 MMOL/L (ref 3.5–5.1)
POTASSIUM SERPL-SCNC: 3.8 MMOL/L (ref 3.5–5.1)
PROT SERPL-MCNC: 7.1 G/DL (ref 6.4–8.2)
RBC # BLD AUTO: 4.5 M/UL (ref 3.8–5.2)
RBC MORPH BLD: ABNORMAL
RIGHT CCA DIST DIAS: 14.1 CENTIMETER/SECOND
RIGHT CCA DIST SYS: 66.3 CENTIMETER/SECOND
RIGHT CCA PROX DIAS: 12 CM/S
RIGHT CCA PROX SYS: 70 CM/S
RIGHT ECA DIAS: 7.51 CENTIMETER/SECOND
RIGHT ECA SYS: 70.2 CENTIMETER/SECOND
RIGHT ICA DIST DIAS: 21.3 CENTIMETER/SECOND
RIGHT ICA DIST SYS: 62 CENTIMETER/SECOND
RIGHT ICA MID DIAS: 15.3 CENTIMETER/SECOND
RIGHT ICA MID SYS: 63.5 CENTIMETER/SECOND
RIGHT ICA PROX DIAS: 14 CM/S
RIGHT ICA PROX SYS: 57 CM/S
RIGHT ICA/CCA SYS: 0.9
RIGHT VERTEBRAL DIAS: 8.66 CENTIMETER/SECOND
RIGHT VERTEBRAL SYS: 56.5 CENTIMETER/SECOND
SAMPLES BEING HELD,HOLD: NORMAL
SODIUM SERPL-SCNC: 137 MMOL/L (ref 136–145)
SODIUM SERPL-SCNC: 137 MMOL/L (ref 136–145)
TRIGL SERPL-MCNC: 116 MG/DL (ref ?–150)
VLDLC SERPL CALC-MCNC: 23.2 MG/DL
WBC # BLD AUTO: 5.1 K/UL (ref 3.6–11)

## 2021-08-09 PROCEDURE — 70551 MRI BRAIN STEM W/O DYE: CPT

## 2021-08-09 PROCEDURE — 97165 OT EVAL LOW COMPLEX 30 MIN: CPT

## 2021-08-09 PROCEDURE — 80061 LIPID PANEL: CPT

## 2021-08-09 PROCEDURE — 74011250636 HC RX REV CODE- 250/636: Performed by: INTERNAL MEDICINE

## 2021-08-09 PROCEDURE — 36415 COLL VENOUS BLD VENIPUNCTURE: CPT

## 2021-08-09 PROCEDURE — 93880 EXTRACRANIAL BILAT STUDY: CPT

## 2021-08-09 PROCEDURE — 85025 COMPLETE CBC W/AUTO DIFF WBC: CPT

## 2021-08-09 PROCEDURE — 96372 THER/PROPH/DIAG INJ SC/IM: CPT

## 2021-08-09 PROCEDURE — 93306 TTE W/DOPPLER COMPLETE: CPT | Performed by: INTERNAL MEDICINE

## 2021-08-09 PROCEDURE — 99218 HC RM OBSERVATION: CPT

## 2021-08-09 PROCEDURE — 80053 COMPREHEN METABOLIC PANEL: CPT

## 2021-08-09 PROCEDURE — 82728 ASSAY OF FERRITIN: CPT

## 2021-08-09 PROCEDURE — 93306 TTE W/DOPPLER COMPLETE: CPT

## 2021-08-09 PROCEDURE — 74011250637 HC RX REV CODE- 250/637: Performed by: INTERNAL MEDICINE

## 2021-08-09 RX ORDER — DEXAMETHASONE 4 MG/1
6 TABLET ORAL DAILY
Status: DISCONTINUED | OUTPATIENT
Start: 2021-08-09 | End: 2021-08-12 | Stop reason: HOSPADM

## 2021-08-09 RX ORDER — AMLODIPINE BESYLATE 5 MG/1
5 TABLET ORAL DAILY
Status: ON HOLD | COMMUNITY
End: 2021-08-12 | Stop reason: SDUPTHER

## 2021-08-09 RX ORDER — PRAVASTATIN SODIUM 20 MG/1
20 TABLET ORAL
COMMUNITY
End: 2021-08-12

## 2021-08-09 RX ORDER — GUAIFENESIN 100 MG/5ML
81 LIQUID (ML) ORAL DAILY
Status: DISCONTINUED | OUTPATIENT
Start: 2021-08-09 | End: 2021-08-12 | Stop reason: HOSPADM

## 2021-08-09 RX ORDER — SODIUM CHLORIDE 0.9 % (FLUSH) 0.9 %
5-40 SYRINGE (ML) INJECTION AS NEEDED
Status: DISCONTINUED | OUTPATIENT
Start: 2021-08-09 | End: 2021-08-12 | Stop reason: HOSPADM

## 2021-08-09 RX ORDER — ENOXAPARIN SODIUM 100 MG/ML
40 INJECTION SUBCUTANEOUS EVERY 12 HOURS
Status: DISCONTINUED | OUTPATIENT
Start: 2021-08-09 | End: 2021-08-09 | Stop reason: SDUPTHER

## 2021-08-09 RX ORDER — SODIUM CHLORIDE 0.9 % (FLUSH) 0.9 %
5-40 SYRINGE (ML) INJECTION EVERY 8 HOURS
Status: DISCONTINUED | OUTPATIENT
Start: 2021-08-09 | End: 2021-08-12 | Stop reason: HOSPADM

## 2021-08-09 RX ORDER — PRAVASTATIN SODIUM 20 MG/1
20 TABLET ORAL
Status: DISCONTINUED | OUTPATIENT
Start: 2021-08-09 | End: 2021-08-11

## 2021-08-09 RX ORDER — POLYETHYLENE GLYCOL 3350 17 G/17G
17 POWDER, FOR SOLUTION ORAL DAILY PRN
Status: DISCONTINUED | OUTPATIENT
Start: 2021-08-09 | End: 2021-08-12 | Stop reason: HOSPADM

## 2021-08-09 RX ADMIN — DEXAMETHASONE 6 MG: 4 TABLET ORAL at 17:14

## 2021-08-09 RX ADMIN — Medication 10 ML: at 22:00

## 2021-08-09 RX ADMIN — PRAVASTATIN SODIUM 20 MG: 20 TABLET ORAL at 21:48

## 2021-08-09 RX ADMIN — ASPIRIN 81 MG 81 MG: 81 TABLET ORAL at 12:00

## 2021-08-09 RX ADMIN — Medication 10 ML: at 17:14

## 2021-08-09 RX ADMIN — Medication 10 ML: at 10:00

## 2021-08-09 RX ADMIN — ENOXAPARIN SODIUM 40 MG: 40 INJECTION SUBCUTANEOUS at 18:22

## 2021-08-09 RX ADMIN — ENOXAPARIN SODIUM 40 MG: 40 INJECTION SUBCUTANEOUS at 06:54

## 2021-08-09 NOTE — PROGRESS NOTES
IDR Team; MD, Nursing, Care Manager, Physical therapy, Nursing Supervisor, Pharmacy and Dietician, met to review patient's plan of care. Discussed goals, interventions, barriers and progress. RUR: NA Observation   RRAT: 8 LOW    Team will continue to monitor progress and report any concerns to the physician and care management as indicated. Transition of Care Plan:   Patient doing ok. Patient moved from [place] to sisters's home and contracted COVID. Entire household is COVID positive. Her brother in law recently admitted here x2 and on second admission, transferred to tertiary care. CT negative for stroke. MRI shows small scattered acute infarcts in the left frontal and parietal lobes and acute sinusitis.

## 2021-08-09 NOTE — PROGRESS NOTES
Care Management Interventions  PCP Verified by CM: Yes MD Pieter Holcomb MD)  Last Visit to PCP: 02/11/21  Palliative Care Criteria Met (RRAT>21 & CHF Dx)?: No (No MD order for Palliative Care)  Mode of Transport at Discharge: Other (see comment) (POV/Family)  Transition of Care Consult (CM Consult): Discharge Planning  Discharge Durable Medical Equipment: No  Physical Therapy Consult: Yes  Occupational Therapy Consult: Yes  Speech Therapy Consult: No  Current Support Network: Other (Patient moved from [place] to live with her sister Sha Bazzi)  Confirm Follow Up Transport: [address] Provided?: No  Discharge Location  Discharge Placement: Home     Spoke with the patient via phone because the patient is in COVID Isolation. She is alert, oriented, coherent and lucid. Patient stated that she moved from [place], [place] three to four months ago to move in with her sister, Sha Bazzi. The entire family has COVID. Her PCP: Jeff Sheriff MD. Saw her PCP in February. She stated that she was going to stay with her PCP in [place]. Informed the patient that she was initially admitted to Inpatient status but subsequently downgraded to Observation. After a second level review was done, patient remained Observation. Explained to the patient what each level of care means and the notification letters that go with each level: IM Notification, State/MOON Observation letters, and Condition Code 44 notification regarding the downgrade. She verbalized understanding. Copies to patient and copies placed in the patient's chart. Packet of information given to the patient including: Care Management Letter contact information, copy of an Advance Directive form and brochure Your Right to Decide, copies of the notification letters. Encouraged her to let the nurses know if she wants to get in touch with me.     Advance Care Planning     Advance Care Planning Activator (Inpatient)  Conversation Note      Date of ACP Conversation: 08/09/21     Conversation Conducted with:   Patient with Decision Making Capacity    ACP Activator: [address]:  Patient's daughter:  Corry Mtz but can not remember her number. Current Designated Health Care Decision Maker:     Click here to complete [employer] including selection of the Healthcare Decision Maker Relationship (ie \"Primary\")      Care Preferences    Ventilation: \"If you were in your present state of health and suddenly became very ill and were unable to breathe on your own, what would your preference be about the use of a ventilator (breathing machine) if it were available to you? \"      If patient would desire the use of a ventilator (breathing machine), answer \"yes\", if not \"no\":yes    \"If your health worsens and it becomes clear that your chance of recovery is unlikely, what would your preference be about the use of a ventilator (breathing machine) if it were available to you? \"     Would the patient desire the use of a ventilator (breathing machine)? NO      Resuscitation  \"CPR works best to restart the heart when there is a sudden event, like a heart attack, in someone who is otherwise healthy. Unfortunately, CPR does not typically restart the heart for people who have serious health conditions or who are very sick. \"    \"In the event your heart stopped as a result of an underlying serious health condition, would you want attempts to be made to restart your heart (answer \"yes\" for attempt to resuscitate) or would you prefer a natural death (answer \"no\" for do not attempt to resuscitate)? \" no      [] Yes  [x] No   Educated Patient / Epifanio Jacomet regarding differences between Advance Directives and portable DNR orders.     Length of ACP Conversation in minutes: 30 minutes    Conversation Outcomes:  [x] ACP discussion completed  [] Existing advance directive reviewed with patient; no changes to patient's previously recorded wishes     [] New Advance Directive completed   [] Portable Do Not Resuscitate prepared for Provider review and signature  [] POLST/POST/MOLST/MOST prepared for Provider review and signature      Follow-up plan:    [] Schedule follow-up conversation to continue planning  [x] Referred individual to Provider for additional questions/concerns   [] Advised patient/agent/surrogate to review completed ACP document and update if needed with changes in condition, patient preferences or care setting     [] This note routed to one or more involved healthcare providers

## 2021-08-09 NOTE — PROGRESS NOTES
Problem: Falls - Risk of  Goal: *Absence of Falls  Description: Document Shruthi Arevalo Fall Risk and appropriate interventions in the flowsheet. 8/8/2021 2133 by Elías Keen RN  Outcome: Progressing Towards Goal  Note: Fall Risk Interventions:            Medication Interventions: Bed/chair exit alarm, Patient to call before getting OOB, Teach patient to arise slowly         History of Falls Interventions: Bed/chair exit alarm, Door open when patient unattended      8/8/2021 2132 by Elías Keen RN  Outcome: Progressing Towards Goal  Note: Fall Risk Interventions:            Medication Interventions: Bed/chair exit alarm, Patient to call before getting OOB, Teach patient to arise slowly         History of Falls Interventions: Bed/chair exit alarm, Door open when patient unattended         Problem: Patient Education: Go to Patient Education Activity  Goal: Patient/Family Education  8/8/2021 2133 by Elías Keen RN  Outcome: Progressing Towards Goal  8/8/2021 2132 by Elías Keen RN  Outcome: Progressing Towards Goal     Problem: Risk for Spread of Infection  Goal: Prevent transmission of infectious organism to others  Description: Prevent the transmission of infectious organisms to other patients, staff members, and visitors.   8/8/2021 2133 by Elías Keen RN  Outcome: Progressing Towards Goal  8/8/2021 2132 by Elías Keen RN  Outcome: Progressing Towards Goal     Problem: Patient Education:  Go to Education Activity  Goal: Patient/Family Education  8/8/2021 2133 by Elías Keen RN  Outcome: Progressing Towards Goal  8/8/2021 2132 by Elías Keen RN  Outcome: Progressing Towards Goal     Problem: Airway Clearance - Ineffective  Goal: Achieve or maintain patent airway  8/8/2021 2133 by Elías Keen RN  Outcome: Progressing Towards Goal  8/8/2021 2132 by Elías Keen RN  Outcome: Progressing Towards Goal     Problem: Gas Exchange - Impaired  Goal: Absence of hypoxia  8/8/2021 2133 by Dave Luna RN  Outcome: Progressing Towards Goal  8/8/2021 2132 by Dave Luna RN  Outcome: Progressing Towards Goal  Goal: Promote optimal lung function  8/8/2021 2133 by Dave Luna RN  Outcome: Progressing Towards Goal  8/8/2021 2132 by Maryse Rosa RN  Outcome: Progressing Towards Goal     Problem: Breathing Pattern - Ineffective  Goal: Ability to achieve and maintain a regular respiratory rate  Outcome: Progressing Towards Goal     Problem:  Body Temperature -  Risk of, Imbalanced  Goal: Ability to maintain a body temperature within defined limits  8/8/2021 2133 by Dave Luna RN  Outcome: Progressing Towards Goal  8/8/2021 2132 by Dave Luna RN  Outcome: Progressing Towards Goal  Goal: Will regain or maintain usual level of consciousness  Outcome: Resolved/Met  Goal: Complications related to the disease process, condition or treatment will be avoided or minimized  8/8/2021 2133 by Dave Luna RN  Outcome: Progressing Towards Goal  8/8/2021 2132 by Dave Luna RN  Outcome: Progressing Towards Goal     Problem: Isolation Precautions - Risk of Spread of Infection  Goal: Prevent transmission of infectious organism to others  8/8/2021 2133 by Dave Luna RN  Outcome: Progressing Towards Goal  8/8/2021 2132 by Dave Luna RN  Outcome: Progressing Towards Goal     Problem: Nutrition Deficits  Goal: Optimize nutrtional status  8/8/2021 2133 by Dave Luna RN  Outcome: Progressing Towards Goal  8/8/2021 2132 by Dave Luna RN  Outcome: Progressing Towards Goal     Problem: Risk for Fluid Volume Deficit  Goal: Maintain normal heart rhythm  8/8/2021 2133 by Dave Luna RN  Outcome: Progressing Towards Goal  8/8/2021 2132 by Presley Lizama RN  Outcome: Progressing Towards Goal  Goal: Maintain absence of muscle cramping  8/8/2021 2133 by Presley Lizama RN  Outcome: Progressing Towards Goal  8/8/2021 2132 by Presley Lizama RN  Outcome: Progressing Towards Goal  Goal: Maintain normal serum potassium, sodium, calcium, phosphorus, and pH  8/8/2021 2133 by Presley Lizama RN  Outcome: Progressing Towards Goal  8/8/2021 2132 by Presley Lizama RN  Outcome: Progressing Towards Goal     Problem: Loneliness or Risk for Loneliness  Goal: Demonstrate positive use of time alone when socialization is not possible  8/8/2021 2133 by Presley Lizama RN  Outcome: Progressing Towards Goal  8/8/2021 2132 by Presley Lizama RN  Outcome: Progressing Towards Goal     Problem: Fatigue  Goal: Verbalize increase energy and improved vitality  8/8/2021 2133 by Presley Lizama RN  Outcome: Progressing Towards Goal  8/8/2021 2132 by Presley Lizama RN  Outcome: Progressing Towards Goal     Problem: Patient Education: Go to Patient Education Activity  Goal: Patient/Family Education  8/8/2021 2133 by Presley Lizama RN  Outcome: Progressing Towards Goal  8/8/2021 2132 by Presley Lizama RN  Outcome: Progressing Towards Goal     Problem: Hypertension  Goal: *Blood pressure within specified parameters  Outcome: Progressing Towards Goal  Goal: *Fluid volume balance  Outcome: Progressing Towards Goal  Goal: *Labs within defined limits  Outcome: Progressing Towards Goal

## 2021-08-09 NOTE — ED NOTES
Wtr called the inpt flr to give report. Per Janine Fuentes, all nurses are in rooms at the moment. He will have someone c/b when available.

## 2021-08-09 NOTE — PROGRESS NOTES
Per bedside nurse request, called patient's PCP - Dr. Tiffany Renee, Brandon Lim MD) to ask about prior to admission medications (and complete the Med Rec), as patient is unsure of the names of her medications at home. Spoke to PCP at 1020. Home meds are:  Amlodipine 5mg daily  Pravastatin  20mg daily    Allergy:  Lisinopril - cough    Dr. Jesús Rodriguez states \"I haven't seen this patient in over a year, but Hospitalist may call my cell if any questions:  [phone]".

## 2021-08-09 NOTE — PROGRESS NOTES
Bedside shift change report given to ANA LILIA Stover RN (oncoming nurse) by ARTURO Guevara LPN (offgoing nurse). Report included the following information SBAR, Kardex, MAR and Recent Results.

## 2021-08-09 NOTE — PROGRESS NOTES
Problem: Self Care Deficits Care Plan (Adult)  Goal: *Acute Goals and Plan of Care (Insert Text)  Note:   FUNCTIONAL STATUS PRIOR TO ADMISSION: Patient was independent and active without use of DME.     HOME SUPPORT: The patient lived with family but did not require assist.    Occupational Therapy Goals  Initiated 8/9/2021  1. Patient will perform shower transfer with modified independence within 7 day(s). 2.  Patient will perform grooming in standing with independence within 7 day(s). 3.  Patient will participate in standing balance exercise/activities with supervision/set-up for 10 minutes within 7 day(s). OCCUPATIONAL THERAPY EVALUATION  Patient: Luiza Graves (89 y.o. female)  Date: 8/9/2021  Primary Diagnosis: TIA (transient ischemic attack) [G45.9]        Precautions: fall       ASSESSMENT  Based on the objective data described below, the patient presents with s/p  TIA versus CVA. She is awaiting MD to reveal MRI results today. Feels she might be going home tonight. OTR assessed and pt able to get to EOB independently but when putting on her socks she lost her balance and fell toward the right, catching herself with outstretched hand on bed. She is set-up for dressing, independent in sit to stand and stand to sit. She is SBA for ambulation and is noted to side step while coming out of bedroom to bed. She denies numbness/tingling/dizziness. She has WFL BUE strength and motion. She tolerated and maintained balance with slight pressure applied in all directions but was unable to stand on one foot, either side. She does not feel like she has a balance issue but OTR told her she needs for staff to be with her to use toilet for safety. Current Level of Function Impacting Discharge (ADLs/self-care): impaired balance for dynamic sitting and ambulation. Functional Outcome Measure:   The patient scored Total: 85/100 on the Barthel Index outcome measure which is indicative of 15% impaired ability to care for basic self needs/dependency on others; inferred 15% dependency on others for instrumental ADLs. Other factors to consider for discharge: pt lives with others, is retired     Patient will benefit from skilled therapy intervention to address the above noted impairments. PLAN :  Recommendations and Planned Interventions: self care training, therapeutic exercise, balance training, therapeutic activities, and endurance activities    Frequency/Duration: Patient will be followed by occupational therapy 3 times a week to address goals. Recommendation for discharge: (in order for the patient to meet his/her long term goals)  No skilled occupational therapy/ follow up rehabilitation needs identified at this time. This discharge recommendation:  Has been made in collaboration with the attending provider and/or case management    IF patient discharges home will need the following DME: defer to physical therapy to determine if she needs mobility aid       SUBJECTIVE:   Patient stated could it be because I've been in bed for the past 7 days? .Shakir Choudhury told her that typically what is seen with bedrest is generalized weakness, overall fatigue after getting up to use toilet, not balance problems. OBJECTIVE DATA SUMMARY:   HISTORY:   History reviewed. No pertinent past medical history. History reviewed. No pertinent surgical history.     Expanded or extensive additional review of patient history:     Home Situation  Home Environment: Private residence  # Steps to Enter: 4  Rails to Enter: Yes  One/Two Story Residence: Two story  # of Interior Steps: 12  Interior Rails: Left  Living Alone: No  Support Systems: Family member(s), Friends \ neighbors  Patient Expects to be Discharged to Cor[de-identified]ration  Current DME Used/Available at Home: None  Tub or Shower Type: Tub/Shower combination    Hand dominance: Right    EXAMINATION OF PERFORMANCE DEFICITS:  Cognitive/Behavioral Status:  Neurologic State: Alert  Orientation Level: Oriented X4  Cognition: Follows commands             Skin: intact    Edema: none noted    Hearing: Auditory  Auditory Impairment: None    Vision/Perceptual:                                Corrective Lenses: Reading glasses    Range of Motion:  WFL BUE          Strength:  4+/5 BUE shoulder and elbow strength     Coordination:   Intact thumb to finger tips and back eyes open/closed    Tone & Sensation:  Normal tone/sensation        Balance:  Sitting: Impaired  Sitting - Static: Good (unsupported)  Sitting - Dynamic: Fair (occasional)    Functional Mobility and Transfers for ADLs:  Bed Mobility:  Rolling: Independent  Supine to Sit: Independent  Sit to Supine: Independent  Scooting: Independent    Transfers:  Sit to Stand: Independent  Stand to Sit: Independent  Toilet Transfer : Modified independent    ADL Assessment:       Oral Facial Hygiene/Grooming: Supervision (if in standing)    Bathing: Setup         Lower Body Dressing: Setup    Toileting: Modified independent    ADL Intervention and task modifications:  Feeding  Feeding Assistance: Independent    Grooming  Grooming Assistance: Supervision  Position Performed: Standing  Washing Hands: Supervision      Upper Body [address] Gown: Minimum  assistance    Lower Body Dressing Assistance  Underpants: Independent  Socks: Independent    Toileting  Toileting Assistance: Modified independent  Bladder Hygiene: Independent  Bowel Hygiene: Independent  Clothing Management: Independent  Adaptive Equipment: Grab bars    Functional Measure:  Barthel Index:    Bathin  Bladder: 10  Bowels: 10  Groomin  Dressing: 10  Feeding: 10  Mobility: 10  Stairs: 5  Toilet Use: 10  Transfer (Bed to Chair and Back): 10  Total: 85/100        The Barthel ADL Index: Guidelines  1. The index should be used as a record of what a patient does, not as a record of what a patient could do.   2. The main aim is to establish degree of independence from any help, physical or verbal, however minor and for whatever reason. 3. The need for supervision renders the patient not independent. 4. A patient's performance should be established using the best available evidence. Asking the patient, friends/relatives and nurses are the usual sources, but direct observation and common sense are also important. However direct testing is not needed. 5. Usually the patient's performance over the preceding 24-48 hours is important, but occasionally longer periods will be relevant. 6. Middle categories imply that the patient supplies over 50 per cent of the effort. 7. Use of aids to be independent is allowed. Erika Helm, Barthel, D.W. ([id number]). Functional evaluation: the Barthel Index. [address] [facility] (14)2. Kiesha Morocho erika LIZZETTE Quinones, Jesusita Pichardo., Geovany Blanco., [place], [address] [place] [address] (). Measuring the change indisability after inpatient rehabilitation; comparison of the responsiveness of the Barthel Index and Functional [place] Measure. Journal of Neurology, Neurosurgery, and Psychiatry, 66(4), 283-994. Carlo Ho, KWASIJ.ALISHA, KIKO Mckeon, & Savanna Dial, MIsidraA. (2004.) Assessment of post-stroke quality of life in cost-effectiveness studies: The usefulness of the Barthel Index and the EuroQoL-5D.  Quality of Life Research, 15, 807-37         Occupational Therapy Evaluation Charge Determination   History Examination Decision-Making   LOW Complexity : Brief history review  LOW Complexity : 1-3 performance deficits relating to physical, cognitive , or psychosocial skils that result in activity limitations and / or participation restrictions  LOW Complexity : No comorbidities that affect functional and no verbal or physical assistance needed to complete eval tasks       Based on the above components, the patient evaluation is determined to be of the following complexity level: LOW   Pain Ratin/10    Activity Tolerance:   Good    After treatment patient left in no apparent distress:    Supine in bed and Call bell within reach    COMMUNICATION/EDUCATION:   The patients plan of care was discussed with: Registered nurse and Case management. Patient/family have participated as able in goal setting and plan of care. This patients plan of care is appropriate for delegation to [facility].     Thank you for this referral.  Glenny Mccarthy OT  Time Calculation: 24 mins

## 2021-08-10 PROBLEM — I63.9 STROKE (CEREBRUM) (HCC): Status: ACTIVE | Noted: 2021-08-10

## 2021-08-10 LAB
ANION GAP SERPL CALC-SCNC: 11 MMOL/L (ref 5–15)
BUN SERPL-MCNC: 10 MG/DL (ref 6–20)
BUN/CREAT SERPL: 17 (ref 12–20)
CALCIUM SERPL-MCNC: 8.9 MG/DL (ref 8.5–10.1)
CHLORIDE SERPL-SCNC: 105 MMOL/L (ref 97–108)
CO2 SERPL-SCNC: 24 MMOL/L (ref 21–32)
COMMENT, HOLDF: NORMAL
CREAT SERPL-MCNC: 0.58 MG/DL (ref 0.55–1.02)
CRP SERPL-MCNC: 3.12 MG/DL (ref 0–0.6)
D DIMER PPP FEU-MCNC: 3.54 MG/L FEU (ref 0–0.65)
GLUCOSE SERPL-MCNC: 164 MG/DL (ref 65–100)
LDH SERPL L TO P-CCNC: 350 U/L (ref 81–246)
POTASSIUM SERPL-SCNC: 4.3 MMOL/L (ref 3.5–5.1)
SAMPLES BEING HELD,HOLD: NORMAL
SODIUM SERPL-SCNC: 140 MMOL/L (ref 136–145)

## 2021-08-10 PROCEDURE — 83615 LACTATE (LD) (LDH) ENZYME: CPT

## 2021-08-10 PROCEDURE — 74011250636 HC RX REV CODE- 250/636: Performed by: INTERNAL MEDICINE

## 2021-08-10 PROCEDURE — 85379 FIBRIN DEGRADATION QUANT: CPT

## 2021-08-10 PROCEDURE — 99223 1ST HOSP IP/OBS HIGH 75: CPT | Performed by: PSYCHIATRY & NEUROLOGY

## 2021-08-10 PROCEDURE — 86140 C-REACTIVE PROTEIN: CPT

## 2021-08-10 PROCEDURE — 74011250637 HC RX REV CODE- 250/637: Performed by: INTERNAL MEDICINE

## 2021-08-10 PROCEDURE — 97530 THERAPEUTIC ACTIVITIES: CPT

## 2021-08-10 PROCEDURE — 97162 PT EVAL MOD COMPLEX 30 MIN: CPT

## 2021-08-10 PROCEDURE — 65270000029 HC RM PRIVATE

## 2021-08-10 PROCEDURE — 96372 THER/PROPH/DIAG INJ SC/IM: CPT

## 2021-08-10 PROCEDURE — 99218 HC RM OBSERVATION: CPT

## 2021-08-10 PROCEDURE — 80048 BASIC METABOLIC PNL TOTAL CA: CPT

## 2021-08-10 PROCEDURE — 36415 COLL VENOUS BLD VENIPUNCTURE: CPT

## 2021-08-10 RX ADMIN — Medication 10 ML: at 22:01

## 2021-08-10 RX ADMIN — ENOXAPARIN SODIUM 40 MG: 40 INJECTION SUBCUTANEOUS at 20:46

## 2021-08-10 RX ADMIN — DEXAMETHASONE 6 MG: 4 TABLET ORAL at 09:07

## 2021-08-10 RX ADMIN — Medication 10 ML: at 06:00

## 2021-08-10 RX ADMIN — PRAVASTATIN SODIUM 20 MG: 20 TABLET ORAL at 22:01

## 2021-08-10 RX ADMIN — Medication 10 ML: at 15:10

## 2021-08-10 RX ADMIN — ASPIRIN 81 MG 81 MG: 81 TABLET ORAL at 09:07

## 2021-08-10 RX ADMIN — ENOXAPARIN SODIUM 40 MG: 40 INJECTION SUBCUTANEOUS at 09:09

## 2021-08-10 NOTE — PROGRESS NOTES
Problem: Falls - Risk of  Goal: *Absence of Falls  Description: Document Evelyne Bragg Fall Risk and appropriate interventions in the flowsheet.   Outcome: Progressing Towards Goal  Note: Fall Risk Interventions:            Medication Interventions: Bed/chair exit alarm, Teach patient to arise slowly, Patient to call before getting OOB         History of Falls Interventions: Bed/chair exit alarm

## 2021-08-10 NOTE — ROUTINE PROCESS
Bedside and Verbal shift change report given to ARTURO Guevara RN (oncoming nurse) by ANA LILIA Mae RN (offgoing nurse). Report included the following information SBAR, Kardex, Recent Results and Cardiac Rhythm sr.

## 2021-08-10 NOTE — PROGRESS NOTES
Bedside shift change report given to ARTURO Guevara LPN (oncoming nurse) by Umer Baires RN   (offgoing nurse). Report included the following information SBAR, Kardex, Intake/Output, MAR and Recent Results. Teresa score 2  Bed/chair alarm is in use. If in use it is set at the highest volume. Intravenous fluids were administered, none 0   ml/hr  Patient Vitals for the past 12 hrs:   Temp Pulse Resp BP SpO2   08/10/21 1230  76   92 %   08/10/21 1217 98.6 °F (37 °C) 76  (!) 133/91 96 %   08/10/21 0809 97.2 °F (36.2 °C) 72 18 126/68 92 %     No flowsheet data found. Lab results reviewed. For significant abnormal values and values requiring intervention, see assessment and plan.

## 2021-08-10 NOTE — PROGRESS NOTES
Problem: Mobility Impaired (Adult and Pediatric)  Goal: *Acute Goals and Plan of Care (Insert Text)  Description: FUNCTIONAL STATUS PRIOR TO ADMISSION: Patient was independent and active without use of DME.    HOME SUPPORT PRIOR TO ADMISSION: The patient lived with family but did not require assist.    Physical Therapy Goals  Initiated 8/10/2021  1. Patient will move from supine to sit and sit to supine  in bed with independence within 7 day(s). 2.  Patient will transfer from bed to chair and chair to bed with modified independence using the least restrictive device within 7 day(s). 3.  Patient will perform sit to stand with modified independence within 7 day(s). 4.  Patient will ambulate with modified independence for 150 feet with the least restrictive device within 7 day(s). 5.  Patient will ascend/descend 13 stairs with one handrail(s) with modified independence within 7 day(s). Outcome: Not Met     PHYSICAL THERAPY EVALUATION  Patient: Elizabeth Olvera (13 y.o. female)  Date: 8/10/2021  Primary Diagnosis: TIA (transient ischemic attack) [G45.9]        Precautions:   Contact, Fall (droplet +)    ASSESSMENT  Based on the objective data described below, the patient presents with impaired high level balance with moderate fall risk, decreased endurance, and limited functional mobility on day 2 of admission s/p 20-minute episode of RUE and RLE weakness resulting in GLF. Pt had reportedly been quarantining at home x 10 days with Covid prior to event. Medical work-up has revealed multiple L frontal and parietal infarcts and d-dimer elevated at 3.54 today. Pt presents with SpO2 92% with activity using room air +mild dyspnea on exertion and occasional coughing. She performs mobility in room with up to CGA and demonstrates balance impairments especially with single limit stance and narrow base of support activities.      Current Level of Function Impacting Discharge (mobility/balance): CGA    Functional Outcome Measure: The patient scored 40 on the Mckeon outcome measure which is indicative of moderate fall risk. Other factors to consider for discharge: none additional     Patient will benefit from skilled therapy intervention to address the above noted impairments. PLAN :  Recommendations and Planned Interventions: transfer training, gait training, therapeutic exercises, neuromuscular re-education, patient and family training/education, and therapeutic activities      Frequency/Duration: Patient will be followed by physical therapy:  4 times a week to address goals. Recommendation for discharge: (in order for the patient to meet his/her long term goals)  Physical therapy at least 2 days/week in the home for balance training vs. Outpatient PT pending progress. This discharge recommendation:  Has not yet been discussed the attending provider and/or case management    IF patient discharges home will need the following DME: none         SUBJECTIVE:   Patient stated I think it's because I've been in the bed for 10 days, re: balance impairments. Pt can not say for certain how she would have performed on single limb stance activities prior to current illnesses. Pt received seated EOB with PCT, agreeable to PT and cleared by RN. OBJECTIVE DATA SUMMARY:   HISTORY:    History reviewed. No pertinent past medical history. History reviewed. No pertinent surgical history.     Personal factors and/or comorbidities impacting plan of care: as above    Home Situation  Home Environment: Private residence  # Steps to Enter: 4  Rails to Enter: Yes  Wheelchair Ramp: Yes  One/Two Story Residence: Two story  # of Interior Steps: 12  Interior Rails: Left  Living Alone: No  Support Systems: Family member(s), Friends \ neighbors  Patient Expects to be Discharged toVF Cor[de-identified]ration  Current DME Used/Available at Home: None  Tub or Shower Type: Tub/Shower combination    EXAMINATION/PRESENTATION/DECISION MAKING:   Critical Behavior:  Neurologic State: Alert  Orientation Level: Oriented X4  Cognition: Follows commands     Hearing: Auditory  Auditory Impairment: None  Skin:  LE exposed skin intact  Edema: none noted LEs  Range Of Motion:  AROM: Within functional limits                       Strength:    Strength: Within functional limits                    Tone & Sensation:   Tone: Normal              Sensation: Intact               Coordination:  Coordination: Within functional limits       Functional Mobility:  Bed Mobility:   Independent per OT assessment, not performed with PT. Transfers:  Sit to Stand: Independent  Stand to Sit: Independent                       Balance:   Sitting: Without support (frequent movements and position changes)  Sitting - Static: Good (unsupported)  Sitting - Dynamic: Good (unsupported)  Standing: Without support  Standing - Static: Good  Standing - Dynamic : Fair  Ambulation/Gait Training:  Distance (ft): 60 Feet (ft)     Ambulation - Level of Assistance: Contact guard assistance        Gait Abnormalities: Path deviations to R x 3.  Narrow base of support                                    Functional Measure:  Mckeon Balance Test:    Sitting to Standin  Standing Unsupported: 4  Sitting with Back Unsupported: 4  Standing to Sittin  Transfers: 3  Standing Unsupported with Eyes Closed: 4  Standing Unsupported with Feet Together: 4  Reach Forward with Outstretched Arm: 3   Object: 3  Turn to Look Over Shoulders: 4  Turn 360 Degrees: 1  Alternate Foot on Step/Stool: 2  Standing Unsupported One Foot in Front: 0  Stand on One Le  Total: 40/56         56=Maximum possible score;   0-20=High fall risk  21-40=Moderate fall risk   41-56=Low fall risk                Physical Therapy Evaluation Charge Determination   History Examination Presentation Decision-Making   HIGH Complexity :3+ comorbidities / personal factors will impact the outcome/ POC  HIGH Complexity : 4+ Standardized tests and measures addressing body structure, function, activity limitation and / or participation in recreation  MEDIUM Complexity : Evolving with changing characteristics  Other outcome measures Mckeon  MEDIUM      Based on the above components, the patient evaluation is determined to be of the following complexity level: MEDIUM    Pain Rating:  Denies pain    Activity Tolerance:   requires rest breaks    After treatment patient left in no apparent distress:   Call bell within reach, Side rails x 3, and EOB as found    Pt educated regarding safety precautions including proper footwear and need to contact staff for assistance with all mobility. Pt verbalizes understanding. COMMUNICATION/EDUCATION:   The patients plan of care was discussed with: Occupational therapist, Registered nurse, and Rehabilitation technician, interdisciplinary team at rounds. Fall prevention education was provided and the patient/caregiver indicated understanding., Patient/family have participated as able in goal setting and plan of care. , and Patient/family agree to work toward stated goals and plan of care.     Thank you for this referral.  Omer Johnson, PT, DPT   Time Calculation: 32 mins

## 2021-08-10 NOTE — CONSULTS
Neurology Note    Patient ID:  Lamar Kennedy  [id number]  23 y.o.  1950      Date of Consultation:  August 10, 2021    Referring Physician: Dr. Claus Garcia    Reason for Consultation:  Altered mental status. Assessment and Plan:    The patient is a pleasant 24-year-old female who was admitted to the hospital with acute onset of right upper and lower extremity weakness with improvement in symptoms. She also does have upper respiratory symptoms and was diagnosed with Covid. MRI findings were notable for an acute left hemispheric stroke. Acute stroke:  Her risk factors for stroke include hypertension and dyslipidemia and smoking  Mri was positive for an acute stroke. Continue asa daily. Dyslipidemia: - continue lipitor. LDL > 140. Please check a [id number]. Htn: goal blood pressure under 140  Carotid doppler with no flow limiting stenosis. Echo completed. Continue pt and ot. Smoking cessation education was provided. I provided stroke education today in regards to risk factors for stroke and lifestyle modifications to help minimize the risk of future stroke. This included medication compliance, regular follow up with primary care physician,  and healthy lifestyle habits (nutrition/exercise)    Covid pneumonia:  Currently on room air and improving      There is no other additional neurology recommendations at this time. If questions arise, please not hesitate to contact me and I will return to see the patient. Subjective: I became weak on my right side       History of Present Illness:   Lamar Kennedy is a 73 y.o. female with a history of hypertension and dyslipidemia who moved to [place] in March. She presented to the emergency department on August 8, 2021 due to a 20-minute episode of right upper and lower extremity weakness. She was also having shortness of breath, cough, and loss of taste. This is slowly improving.  She did have exposure to Covid and was Covid positive upon admission to the hospital.  She did have a MRI completed during hospitalization which reviewed acute infarct in the left frontal and parietal with chronic microvascular ischemic disease. Past medical history:  Hypertension  dyslipidemia        Surgical history:  No past surgical history    Family history:  No family history of a stroke at a young age. Sister with Parkinson's disease    Social History     Tobacco Use    Smoking status: + smoking   Substance Use Topics    Alcohol use:  3 to 4 glasses of wine per night        Allergies   Allergen Reactions    Lisinopril Cough     Note:  Allergy information obtained from PCP's office        Prior to Admission medications    Medication Sig Start Date End Date Taking? Authorizing Provider   amLODIPine (NORVASC) 5 mg tablet Take 5 mg by mouth daily. Yes Provider, Historical   pravastatin (PRAVACHOL) 20 mg tablet Take 20 mg by mouth nightly.    Yes Provider, Historical     Current Facility-Administered Medications   Medication Dose Route Frequency    sodium chloride (NS) flush 5-40 mL  5-40 mL IntraVENous Q8H    sodium chloride (NS) flush 5-40 mL  5-40 mL IntraVENous PRN    polyethylene glycol (MIRALAX) packet 17 g  17 g Oral DAILY PRN    aspirin chewable tablet 81 mg  81 mg Oral DAILY    pravastatin (PRAVACHOL) tablet 20 mg  20 mg Oral QHS    dexAMETHasone (DECADRON) tablet 6 mg  6 mg Oral DAILY    acetaminophen (TYLENOL) tablet 650 mg  650 mg Oral Q6H PRN    ondansetron (ZOFRAN) injection 4 mg  4 mg IntraVENous Q4H PRN    enoxaparin (LOVENOX) injection 40 mg  40 mg SubCUTAneous Q12H     Review of Systems:    General, constitutional: negative  Eyes, vision: negative  Ears, nose, throat: negative  Cardiovascular, heart: negative  Respiratory: negative  Gastrointestinal: negative  Genitourinary: negative  Musculoskeletal: negative  Skin and integumentary: negative  Psychiatric: negative  Endocrine: negative  Neurological: negative, except for HPI  Hematologic/lymphatic: negative  Allergy/immunology: negative      Objective:     Visit Vitals  BP (!) 133/91 (BP Patient Position: Sitting)   Pulse 76   Temp 98.6 °F (37 °C)   Resp 18   Ht 5' 5\" (1.651 m)   Wt 146 lb 6.4 oz (66.4 kg)   SpO2 92%   BMI 24.36 kg/m²       Physical Exam:    There are limitations to the neurological examination due to the technological features of telemedicine    General:  appears well nourished in no acute distress  Skin: intact  Respiratory: no labored breathing      Neurological exam:    Awake, alert, oriented to person, place and time  Recent and remote memory were normal  Attention and concentration were intact  Language was intact. There was no aphasia  Speech: no dysarthria  Fund of knowledge was preserved    Cranial nerves:   Visual fields were full  Eomi, no evidence of nystagmus  Facial motor: normal and symmetric  Hearing intact    Tongue: midline without fasciculations    Motor:     No evidence of fasciculations  There was a mild right pronator drift. Strength testing:  She was at least a 4+ out of 5 throughout with no clear focal weakness except for the mild right pronator drift.     Sensory:  Denies sensory loss      Reflexes:  Unable to obtain via telemedicine    Cerebellar testing:  no tremor apparent, finger/nose and kelli were intact    Gait: This was not assessed due to the complexity of her being in a respiratory isolation room    Labs:     Lab Results   Component Value Date/Time    Sodium 140 08/10/2021 05:39 AM    Potassium 4.3 08/10/2021 05:39 AM    Chloride 105 08/10/2021 05:39 AM    Glucose 164 (H) 08/10/2021 05:39 AM    BUN 10 08/10/2021 05:39 AM    Creatinine 0.58 08/10/2021 05:39 AM    Calcium 8.9 08/10/2021 05:39 AM    WBC 5.1 08/09/2021 06:44 AM    HCT 41.6 08/09/2021 06:44 AM    HGB 15.2 08/09/2021 06:44 AM    PLATELET 649 81/75/1821 06:44 AM       Imaging:    Results from Hospital Encounter encounter on 08/08/21    MRI BRAIN WO CONT    Narrative  EXAM: MRI BRAIN WO CONT    INDICATION:    transient R ext weakness    COMPARISON:  CT head 8/8/2021. CONTRAST: None. TECHNIQUE:  Multiplanar multisequence acquisition without contrast of the brain. FINDINGS:  There are multiple small acute infarcts in the left frontal and parietal lobes,  including in the parasagittal posterior left frontal lobe, involving both the  precentral and postcentral gyri, and in the left lateral frontal lobe. The ventricles are normal in size and position. There are scattered  periventricular and deep white matter T2/FLAIR hyperintensities, consistent with  mild chronic microvascular ischemic disease. There is no acute hemorrhage,  extra-axial fluid collection, or mass effect. There is no cerebellar tonsillar  herniation. Expected arterial flow-voids are present. Scattered mucosal thickening and air-fluid levels throughout the paranasal  sinuses, most pronounced in the bilateral sphenoid sinuses, bilateral ethmoidal  air cells and left maxillary sinus. The mastoid air cells and middle ears are  clear. The orbital contents are within normal limits. No significant osseous or  scalp lesions are identified. Impression  1. Multiple small scattered acute infarcts in the left frontal and parietal  lobes. 2. Mild chronic microvascular ischemic disease. 3. Pansinus mucosal thickening with scattered air-fluid levels, consistent with  acute sinusitis. Results from Hospital Encounter encounter on 08/08/21    CT HEAD WO CONT    Narrative  HEAD CT WITHOUT CONTRAST: 8/8/2021 5:59 PM    INDICATION: Right-sided weakness, now resolved. COMPARISON: None. PROCEDURE: Axial images of the head were obtained without contrast. Coronal and  sagittal reformats were performed. CT dose reduction was achieved through use of  a standardized protocol tailored for this examination and automatic exposure  control for dose modulation.     FINDINGS: The ventricles and sulci are appropriate in size and configuration for  age. No loss of gray-white differentiation to suggest late acute or early  subacute infarction. No mass effect or intracranial hemorrhage. There are air-fluid levels in the left maxillary, left ethmoid, and bilateral  sphenoid sinuses. Impression  No acute intracranial abnormality. I did independently review the brain MRI from August 9, 2021. There is acute stroke in the left frontal and parietal lobes. There was chronic microvascular ischemic changes. Carotid Doppler study was performed on August 9, 2021 which revealed minimal to mild stenosis of her bilateral ICAs.            Principal Problem:    TIA (transient ischemic attack) (8/8/2021)    Active Problems:    COVID-19 (8/8/2021)        Complex decision making was necessary due to the patient's current medical condition and other medical co-morbidities.            Signed By:  Liz Loredo DO FAAN    August 10, 2021

## 2021-08-10 NOTE — PROGRESS NOTES
Bedside shift change report given to OMA Manzo RN (oncoming nurse) by [employer]. FRANSICO Guevara (offgoing nurse). Report included the following information SBAR, Kardex, MAR and Recent Results.

## 2021-08-10 NOTE — PROGRESS NOTES
IDR Team; MD, Nursing, Care Manager, Physical therapy, Nursing Supervisor, Pharmacy and Dietician, met to review patient's plan of care. Discussed goals, interventions, barriers and progress. RUR: 9%  LOW    Team will continue to monitor progress and report any concerns to the physician and care management as indicated. Transition of Care Plan:   Patient is doing better and feeling better. Dr. Bennie Valle is trying to obtain a neurology consult with Dr. Jany Gleason. According to therapy, patient's balance is off. Medicare pt has received, reviewed 1st  IM letter informing her of her  right to appeal the discharge. Signed copied has been placed on pt bedside chart by her primary care nurse. Paper form placed in the chart. Patient remains in [place] Isolation so verbal notification given to the patient via phone.

## 2021-08-10 NOTE — PROGRESS NOTES
Problem: Falls - Risk of  Goal: *Absence of Falls  Description: Document Jennie Westfall Fall Risk and appropriate interventions in the flowsheet. Outcome: Progressing Towards Goal  Note: Fall Risk Interventions:            Medication Interventions: Patient to call before getting OOB, Teach patient to arise slowly         History of Falls Interventions: Room close to nurse's station         Problem: Patient Education: Go to Patient Education Activity  Goal: Patient/Family Education  Outcome: Progressing Towards Goal     Problem: Risk for Spread of Infection  Goal: Prevent transmission of infectious organism to others  Description: Prevent the transmission of infectious organisms to other patients, staff members, and visitors. Outcome: Progressing Towards Goal     Problem: Patient Education:  Go to Education Activity  Goal: Patient/Family Education  Outcome: Progressing Towards Goal     Problem: Airway Clearance - Ineffective  Goal: Achieve or maintain patent airway  Outcome: Progressing Towards Goal     Problem: Gas Exchange - Impaired  Goal: Absence of hypoxia  Outcome: Progressing Towards Goal  Goal: Promote optimal lung function  Outcome: Progressing Towards Goal     Problem: Breathing Pattern - Ineffective  Goal: Ability to achieve and maintain a regular respiratory rate  Outcome: Progressing Towards Goal     Problem:  Body Temperature -  Risk of, Imbalanced  Goal: Ability to maintain a body temperature within defined limits  Outcome: Progressing Towards Goal  Goal: Complications related to the disease process, condition or treatment will be avoided or minimized  Outcome: Progressing Towards Goal     Problem: Isolation Precautions - Risk of Spread of Infection  Goal: Prevent transmission of infectious organism to others  Outcome: Progressing Towards Goal     Problem: Nutrition Deficits  Goal: Optimize nutrtional status  Outcome: Progressing Towards Goal     Problem: Risk for Fluid Volume Deficit  Goal: Maintain normal heart rhythm  Outcome: Progressing Towards Goal  Goal: Maintain absence of muscle cramping  Outcome: Progressing Towards Goal  Goal: Maintain normal serum potassium, sodium, calcium, phosphorus, and pH  Outcome: Progressing Towards Goal     Problem: Loneliness or Risk for Loneliness  Goal: Demonstrate positive use of time alone when socialization is not possible  Outcome: Progressing Towards Goal     Problem: Fatigue  Goal: Verbalize increase energy and improved vitality  Outcome: Progressing Towards Goal     Problem: Patient Education: Go to Patient Education Activity  Goal: Patient/Family Education  Outcome: Progressing Towards Goal     Problem: Hypertension  Goal: *Blood pressure within specified parameters  Outcome: Progressing Towards Goal  Goal: *Fluid volume balance  Outcome: Progressing Towards Goal  Goal: *Labs within defined limits  Outcome: Progressing Towards Goal     Problem: Patient Education: Go to Patient Education Activity  Goal: Patient/Family Education  Outcome: Progressing Towards Goal

## 2021-08-10 NOTE — PROGRESS NOTES
Problem: Falls - Risk of  Goal: *Absence of Falls  Description: Document Albina Flores Fall Risk and appropriate interventions in the flowsheet. Outcome: Progressing Towards Goal  Note: Fall Risk Interventions:  Mobility Interventions: Patient to call before getting OOB         Medication Interventions: Bed/chair exit alarm, Patient to call before getting OOB    Elimination Interventions: Bed/chair exit alarm, Call light in reach, Patient to call for help with toileting needs, Stay With Me (per policy)    History of Falls Interventions: Bed/chair exit alarm, Door open when patient unattended, Room close to nurse's station         Problem: Patient Education: Go to Patient Education Activity  Goal: Patient/Family Education  Outcome: Progressing Towards Goal     Problem: Risk for Spread of Infection  Goal: Prevent transmission of infectious organism to others  Description: Prevent the transmission of infectious organisms to other patients, staff members, and visitors. Outcome: Progressing Towards Goal     Problem: Airway Clearance - Ineffective  Goal: Achieve or maintain patent airway  Outcome: Progressing Towards Goal     Problem: Gas Exchange - Impaired  Goal: Promote optimal lung function  Outcome: Progressing Towards Goal     Problem: Breathing Pattern - Ineffective  Goal: Ability to achieve and maintain a regular respiratory rate  Outcome: Progressing Towards Goal     Problem: Body Temperature -  Risk of, Imbalanced  Goal: Ability to maintain a body temperature within defined limits  Outcome: Progressing Towards Goal     Problem: Nutrition Deficits  Goal: Optimize nutrtional status  Outcome: Progressing Towards Goal     Problem: Loneliness or Risk for Loneliness  Goal: Demonstrate positive use of time alone when socialization is not possible  Outcome: Progressing Towards Goal  Pt. Says she is feeling much better today. She is anxious to be discharged when she can.

## 2021-08-10 NOTE — PROGRESS NOTES
[facility]  Hospitalist Progress Note    NAME: Wali Lu   :  1950   MRN:  [id number]     Total duration of encounter: 1 day      Interim Hospital Summary: 73 y.o. female who presented on 2021 with TIA (transient ischemic attack). She has no past medical history on file. Yue Crockernt Has been on tx for HTN and HDL [place]CP in [place].  Ova Tomas to  in March living with sister. Exposed to Covid about 2 weeks ago - in self quarantine. Presented to ED Sun following 20 min episode of R UE/LE paralysis. CT in ED was negative. Admitted for stroke w/u. Has been weak, SOB, coughing, anorexic, lost sense of taste - slowing improving. Subjective:     Chief Complaint / Reason for Physician Visit  \"better\".   Discussed with RN   Able to walk to bathroom  Eating better  No SOB  Unsteady with PT today    Review of Systems:  Symptom Y/N Comments  Symptom Y/N Comments   Fever/Chills n   Chest Pain n    Poor Appetite y  better  Edema n    Cough y   Abdominal Pain n    Sputum n   Joint Pain n    SOB/INGRAM y   Pruritis/Rash n    Nausea/vomit n   Tolerating PT/OT y    Diarrhea n   Tolerating Diet y    Constipation n   Other         Current Facility-Administered Medications:     sodium chloride (NS) flush 5-40 mL, 5-40 mL, IntraVENous, Q8H, Julienne Pallas A, MD, 10 mL at 21 171    sodium chloride (NS) flush 5-40 mL, 5-40 mL, IntraVENous, PRN, Claude Ket, MD    polyethylene glycol (MIRALAX) packet 17 g, 17 g, Oral, DAILY PRN, Claude Ket, MD    aspirin chewable tablet 81 mg, 81 mg, Oral, DAILY, Claude Ket, MD, 81 mg at 21 1200    pravastatin (PRAVACHOL) tablet 20 mg, 20 mg, Oral, QHS, Claude Ket, MD    dexAMETHasone (DECADRON) tablet 6 mg, 6 mg, Oral, DAILY, Claude Ket, MD, 6 mg at 21 1714    acetaminophen (TYLENOL) tablet 650 mg, 650 mg, Oral, Q6H PRN, Claude Ket, MD    ondansetron [facility]) injection 4 mg, 4 mg, IntraVENous, Q4H PRN, Claude Ket, MD    enoxaparin (LOVENOX) injection 40 mg, 40 mg, SubCUTAneous, Q12H, Sherley Edward MD, 40 mg at 08/09/21 1822    Objective:     VITALS:   Patient Vitals for the past 12 hrs:   Temp Pulse Resp BP SpO2   08/09/21 2000 98.1 °F (36.7 °C) 70 19 124/74 94 %   08/09/21 1830 97.3 °F (36.3 °C) 65 18 124/63 96 %   08/09/21 1630 98.8 °F (37.1 °C) 74 18 116/66 95 %   08/09/21 1430 97.2 °F (36.2 °C) 68 18 104/74 96 %   08/09/21 1319 98.1 °F (36.7 °C) 72 18 134/68 93 %   08/09/21 1030 97.9 °F (36.6 °C) 67 18 118/63 93 %       Intake/Output Summary (Last 24 hours) at 8/9/2021 2148  Last data filed at 8/9/2021 1800  Gross per 24 hour   Intake 720 ml   Output    Net 720 ml        PHYSICAL EXAM:  General: WD, Thin. Alert, cooperative, no acute distress    EENT:  EOMI. Anicteric sclerae. MMM  Resp:  Mild crackles, no wheezing. No accessory muscle use  CV:  Regular  rhythm,  No edema  GI:  Soft, Non distended, Non tender. +Bowel sounds  Neurologic:  Alert and oriented X 3, normal speech, nonfocal  Psych:   Not anxious nor agitated  Skin:  No rashes. No jaundice    LABS:  I reviewed today's most current labs and imaging studies. Pertinent labs include:  Recent Labs     08/09/21  0644 08/08/21  1647   WBC 5.1 6.8   HGB 15.2 16.4*   HCT 41.6 46.0    288     Recent Labs     08/09/21  0644 08/08/21  1647     137 134*   K 3.8  3.7 4.0     103 101   CO2 25  25 23   *  106* 107*   BUN 13  12 15   CREA 0.59  0.59 0.73   CA 8.6  8.6 9.0   ALB 2.8* 3.2*   TBILI 0.7 0.8   ALT 28 32   INR  --  1.1       EKG: NSR 74 bpm, Normal     Radiology:  CXR 8/9:  Heterogeneous airspace opacities bilaterally. No pleural effusion or  pneumothorax. Heart size and mediastinal contours are normal. Osseous structures  are within normal limits.   IMPRESSION : Heterogeneous bilateral airspace opacities in keeping with COVID 19 infection.     CT HEAD 8/8:  The ventricles and sulci are appropriate in size and configuration for  age.  No loss of gray-white differentiation to suggest late acute or early  subacute infarction. No mass effect or intracranial hemorrhage.   There are air-fluid levels in the left maxillary, left ethmoid, and bilateral sphenoid sinuses.   IMPRESSION:  No acute intracranial abnormality.     MRI BRAIN 8/9:  There are multiple small acute infarcts in the left frontal and parietal lobes,  including in the parasagittal posterior left frontal lobe, involving both the  precentral and postcentral gyri, and in the left lateral frontal lobe.   The ventricles are normal in size and position. There are scattered  periventricular and deep white matter T2/FLAIR hyperintensities, consistent with  mild chronic microvascular ischemic disease. There is no acute hemorrhage,  extra-axial fluid collection, or mass effect. There is no cerebellar tonsillar  herniation. Expected arterial flow-voids are present.   Scattered mucosal thickening and air-fluid levels throughout the paranasal  sinuses, most pronounced in the bilateral sphenoid sinuses, bilateral ethmoidal  air cells and left maxillary sinus. The mastoid air cells and middle ears are  clear. The orbital contents are within normal limits. No significant osseous or  scalp lesions are identified.   IMPRESSION  1. Multiple small scattered acute infarcts in the left frontal and parietal lobes. 2. Mild chronic microvascular ischemic disease. 3. Pansinus mucosal thickening with scattered air-fluid levels, consistent with acute sinusitis.     ECHO 8/9:  · LV: Estimated LVEF is 65 - 70%. Visually measured ejection fraction. Normal cavity size, systolic function (ejection fraction normal) and diastolic function. Upper normal wall thickness. Age-appropriate left ventricular diastolic function.     CAROTID DUPLEX 8/9:  Minimal stenosis of the right ICA. Intimal thickening. Mild (<50%) stenosis of the left ICA.  Mild, heterogeneous and calcific plaque.     EKG 8/8: NSR 74 bpm      Procedures: see electronic medical records for all procedures/Xrays and details which were not copied into this note but were reviewed prior to creation of Plan. Assessment / Plan:    Principal Problem:    TIA (transient ischemic attack) (8/8/2021)     L Cerebral (Frontal / Parietal) CVA  20 min h/o R ext weakness / numbness PTA  No prior h/o vascular disease  Has been home in [facility] - suspected positive follow exposure to nephew from [facility] (whole family ill)  Concern with elevated D-dimer 11 range on admission  Have started on Lovenox 40mg SQ q12  ASA 81mg daily   Cont tx Statin - currently on Pravachol low dose, need switch to Lipitor 40mg  Lipids , , HDL 36,      Active Problems:    COVID-19 (8/8/2021)  No respiratory symptoms  CXR c/w B viral pneumonia  Begin Solumedrol 6mg daily for 10 days  Lovenox bid, confer with Neuro best tx  AM labs to include CRP, Ferritin, BMP, CBC, LD  Monitor temp / oximetry  Droplet plus isolation for now       Lipids  Switch to Lipitor       HTN  BP good, hold Norvasc for now     SAFETY:   Code Status:Full  DVT prophylaxis:Lovenox bid  Stress Ulcer prophylaxis: Pepcid  Bladder catheter:no  Family Contact Info:  Primary Emergency ContactCarSage Valle Phone: [phone]  Bedded: [facility] Room ED08/08  Disposition: TBD, likely home when stable  Admission status:  Observation    Reviewed most current lab test results and cultures  YES  Reviewed most current radiology test results   YES  Review and summation of old records today    NO  Reviewed patient's current orders and MAR    YES  PMH/SH reviewed - no change compared to H&P    Care Plan discussed with:                                   Comments  Patient x     Family      RN x     Care Manager  x     Consultant                           Multidiciplinary team rounds were held today with , nursing, pharmacist and clinical coordinator.   Patient's plan of care was discussed; medications were reviewed and discharge planning was addressed.         ____________________________________________    Total NON Critical Care TIME:  35   Minutes        Comments   >50% of visit spent in counseling and coordination of care   x      Signed: Soha Sparrow MD  [facility] Hospitalist  [phone]

## 2021-08-11 PROCEDURE — 97535 SELF CARE MNGMENT TRAINING: CPT

## 2021-08-11 PROCEDURE — 74011250637 HC RX REV CODE- 250/637: Performed by: INTERNAL MEDICINE

## 2021-08-11 PROCEDURE — 97530 THERAPEUTIC ACTIVITIES: CPT

## 2021-08-11 PROCEDURE — 74011250636 HC RX REV CODE- 250/636: Performed by: INTERNAL MEDICINE

## 2021-08-11 PROCEDURE — 65270000029 HC RM PRIVATE

## 2021-08-11 PROCEDURE — 97116 GAIT TRAINING THERAPY: CPT

## 2021-08-11 RX ORDER — ATORVASTATIN CALCIUM 40 MG/1
40 TABLET, FILM COATED ORAL
Status: DISCONTINUED | OUTPATIENT
Start: 2021-08-11 | End: 2021-08-12 | Stop reason: HOSPADM

## 2021-08-11 RX ORDER — AMLODIPINE BESYLATE 5 MG/1
5 TABLET ORAL DAILY
Status: DISCONTINUED | OUTPATIENT
Start: 2021-08-12 | End: 2021-08-12 | Stop reason: HOSPADM

## 2021-08-11 RX ADMIN — ATORVASTATIN CALCIUM 40 MG: 40 TABLET, FILM COATED ORAL at 23:00

## 2021-08-11 RX ADMIN — Medication 10 ML: at 12:29

## 2021-08-11 RX ADMIN — ENOXAPARIN SODIUM 40 MG: 40 INJECTION SUBCUTANEOUS at 07:03

## 2021-08-11 RX ADMIN — Medication 10 ML: at 07:03

## 2021-08-11 RX ADMIN — DEXAMETHASONE 6 MG: 4 TABLET ORAL at 08:52

## 2021-08-11 RX ADMIN — ASPIRIN 81 MG 81 MG: 81 TABLET ORAL at 08:52

## 2021-08-11 RX ADMIN — Medication 10 ML: at 23:01

## 2021-08-11 RX ADMIN — ENOXAPARIN SODIUM 40 MG: 40 INJECTION SUBCUTANEOUS at 20:47

## 2021-08-11 NOTE — PROGRESS NOTES
[facility]  Hospitalist Progress Note    NAME: Garry German   :  1950   MRN:  [id number]     Total duration of encounter: 2 days      Interim Hospital Summary: 73 y.o. female who presented on 2021 with TIA (transient ischemic attack). She has no past medical history on file. Hiram Fernandez Has been on tx for HTN and HDL [place]CP [place].  Philippe Kamarady to  in March living with sister. Exposed to Covid about 2 weeks ago - in self quarantine. Presented to ED Sun following 20 min episode of R UE/LE paralysis. CT in ED was negative. Admitted for stroke w/u. Has been weak, SOB, coughing, anorexic, lost sense of taste - slowing improving. Multilple family members infected at family gathering on         Subjective:     Chief Complaint / Reason for Physician Visit  \"no c/o\".   Discussed with RN   Ambulating and eating much improved  No SOB, No O2 need  Mildly unsteady with PT again, but not requiring walker / cane    Review of Systems:  Symptom Y/N Comments  Symptom Y/N Comments   Fever/Chills n   Chest Pain n    Poor Appetite n  better  Edema n    Cough y   Abdominal Pain n    Sputum n   Joint Pain n    SOB/INGRAM n   Pruritis/Rash n    Nausea/vomit n   Tolerating PT/OT y    Diarrhea n   Tolerating Diet y    Constipation n   Other         Current Facility-Administered Medications:     sodium chloride (NS) flush 5-40 mL, 5-40 mL, IntraVENous, Q8H, Emily BRITO MD, 10 mL at 08/10/21 2201    sodium chloride (NS) flush 5-40 mL, 5-40 mL, IntraVENous, PRN, Anatoly Abdi MD    polyethylene glycol (MIRALAX) packet 17 g, 17 g, Oral, DAILY PRN, Anatoly Abdi MD    aspirin chewable tablet 81 mg, 81 mg, Oral, DAILY, Anatoly Abdi MD, 81 mg at 08/10/21 9840    pravastatin (PRAVACHOL) tablet 20 mg, 20 mg, Oral, QHS, Anatoly Abdi MD, 20 mg at 08/10/21 2201    dexAMETHasone (DECADRON) tablet 6 mg, 6 mg, Oral, DAILY, Anatoly Abdi MD, 6 mg at 08/10/21 0907    acetaminophen (TYLENOL) tablet 650 mg, 650 mg, Oral, Q6H PRN, Rupert Greenwood MD    [facility]) injection 4 mg, 4 mg, IntraVENous, Q4H PRN, Rupert Greenwood MD    enoxaparin (LOVENOX) injection 40 mg, 40 mg, SubCUTAneous, Q12H, Rupert Greenwood MD, 40 mg at 08/10/21 2046    Objective:     VITALS:   Patient Vitals for the past 12 hrs:   Temp Pulse Resp BP SpO2   08/10/21 2028 97.6 °F (36.4 °C) 64 18 120/61 94 %   08/10/21 1230  76   92 %   08/10/21 1217 98.6 °F (37 °C) 76  (!) 133/91 96 %       Intake/Output Summary (Last 24 hours) at 8/10/2021 2256  Last data filed at 8/10/2021 1800  Gross per 24 hour   Intake 720 ml   Output    Net 720 ml        PHYSICAL EXAM:  General: WD, Thin. Alert, cooperative, no acute distress    EENT:  EOMI. Anicteric sclerae. MMM  Resp:  Mild crackles, no wheezing. No accessory muscle use  CV:  Regular  rhythm,  No edema  GI:  Soft, Non distended, Non tender. +Bowel sounds  Neurologic:  Alert and oriented X 3, normal speech, nonfocal  Psych:   Not anxious nor agitated  Skin:  No rashes. No jaundice    LABS:  I reviewed today's most current labs and imaging studies. Pertinent labs include:  Recent Labs     08/09/21  0644 08/08/21  1647   WBC 5.1 6.8   HGB 15.2 16.4*   HCT 41.6 46.0    288     Recent Labs     08/10/21  0539 08/09/21  0644 08/08/21  1647    137  137 134*   K 4.3 3.8  3.7 4.0    103  103 101   CO2 24 25  25 23   * 103*  106* 107*   BUN 10 13  12 15   CREA 0.58 0.59  0.59 0.73   CA 8.9 8.6  8.6 9.0   ALB  --  2.8* 3.2*   TBILI  --  0.7 0.8   ALT  --  28 32   INR  --   --  1.1       EKG: NSR 74 bpm, Normal     Radiology:  CXR 8/9:  Heterogeneous airspace opacities bilaterally. No pleural effusion or  pneumothorax.  Heart size and mediastinal contours are normal. Osseous structures  are within normal limits.   IMPRESSION : Heterogeneous bilateral airspace opacities in keeping with COVID 19 infection.     CT HEAD 8/8:  The ventricles and sulci are appropriate in size and configuration for  age. No loss of gray-white differentiation to suggest late acute or early  subacute infarction. No mass effect or intracranial hemorrhage.   There are air-fluid levels in the left maxillary, left ethmoid, and bilateral sphenoid sinuses.   IMPRESSION:  No acute intracranial abnormality.     MRI BRAIN 8/9:  There are multiple small acute infarcts in the left frontal and parietal lobes,  including in the parasagittal posterior left frontal lobe, involving both the  precentral and postcentral gyri, and in the left lateral frontal lobe.   The ventricles are normal in size and position. There are scattered  periventricular and deep white matter T2/FLAIR hyperintensities, consistent with  mild chronic microvascular ischemic disease. There is no acute hemorrhage,  extra-axial fluid collection, or mass effect. There is no cerebellar tonsillar  herniation. Expected arterial flow-voids are present.   Scattered mucosal thickening and air-fluid levels throughout the paranasal  sinuses, most pronounced in the bilateral sphenoid sinuses, bilateral ethmoidal  air cells and left maxillary sinus. The mastoid air cells and middle ears are  clear. The orbital contents are within normal limits. No significant osseous or  scalp lesions are identified.   IMPRESSION  1. Multiple small scattered acute infarcts in the left frontal and parietal lobes. 2. Mild chronic microvascular ischemic disease. 3. Pansinus mucosal thickening with scattered air-fluid levels, consistent with acute sinusitis.     ECHO 8/9:  · LV: Estimated LVEF is 65 - 70%. Visually measured ejection fraction. Normal cavity size, systolic function (ejection fraction normal) and diastolic function. Upper normal wall thickness. Age-appropriate left ventricular diastolic function. CAROTID DUPLEX 8/9:  Minimal stenosis of the right ICA. Intimal thickening. Mild (<50%) stenosis of the left ICA.  Mild, heterogeneous and calcific plaque.     EKG 8/8: NSR 74 bpm      Procedures: see electronic medical records for all procedures/Xrays and details which were not copied into this note but were reviewed prior to creation of Plan. NEUROLOGY CONSULT 8/10: The patient is a pleasant 65-year-old female who was admitted to the hospital with acute onset of right upper and lower extremity weakness with improvement in symptoms. She also does have upper respiratory symptoms and was diagnosed with Covid. MRI findings were notable for an acute left hemispheric stroke.     Acute stroke:  Her risk factors for stroke include hypertension and dyslipidemia and smoking  Mri was positive for an acute stroke. Continue asa daily. Dyslipidemia: - continue lipitor. LDL > 140.      Please check a [id number].     Htn: goal blood pressure under 140  Carotid doppler with no flow limiting stenosis. Echo completed. Continue pt and ot. Smoking cessation education was provided.        I provided stroke education today in regards to risk factors for stroke and lifestyle modifications to help minimize the risk of future stroke. This included medication compliance, regular follow up with primary care physician,  and healthy lifestyle habits (nutrition/exercise)     Covid pneumonia:  Currently on room air and improving      S.  [address], DO Neurology        Assessment / Plan:    Principal Problem:    TIA (transient ischemic attack) (8/8/2021)     L Cerebral (Frontal / Parietal) CVA  20 min h/o R ext weakness / numbness PTA  No prior h/o vascular disease  Has been home in [place] - suspected positive follow exposure to nephew from [facility] 7/19 (whole family ill)  Concern with elevated D-dimer 11 range on admission  Have started on Lovenox 40mg SQ q12  ASA 81mg daily   Cont tx Statin - currently on Pravachol low dose, need switch to Lipitor 40mg  Lipids , , HDL 36,   Appreciate recommendations Dr. Rina Wahl     Active Problems:    COVID-19 (8/8/2021)  No respiratory symptoms  CXR c/w B viral pneumonia  Begin Solumedrol 6mg daily for 10 days  Lovenox bid, confer with Neuro best tx - no anticoagulation recommended except ASA 81mg  AM labs to include CRP, Ferritin, BMP, CBC, LD  Monitor temp / oximetry  Droplet plus isolation for now       Lipids  Switch to Lipitor       HTN  BP good, hold Norvasc for now     SAFETY:   Code Status:Full  DVT prophylaxis:Lovenox bid  Stress Ulcer prophylaxis: Pepcid  Bladder catheter:no  Family Contact Info:  Primary Emergency ContactCharolet Sage Denise Phone: [phone]  Bedded: [facility] Room ED08/08  Disposition: TBD, likely home when stable  Admission status:  Observation has been upgraded to Inpt with dx CVA  Reviewed most current lab test results and cultures  YES  Reviewed most current radiology test results   YES  Review and summation of old records today    NO  Reviewed patient's current orders and MAR    YES  PMH/SH reviewed - no change compared to H&P    Care Plan discussed with:                                   Comments  Patient x     Family      RN x     Care Manager  x     Consultant   x Dr. Maxie Ormond team rounds were held today with , nursing, pharmacist and clinical coordinator. Patient's plan of care was discussed; medications were reviewed and discharge planning was addressed.         ____________________________________________    Total NON Critical Care TIME:  40   Minutes        Comments   >50% of visit spent in counseling and coordination of care   x      Signed: Conda Fabry, MD  [facility] Hospitalist  [phone]

## 2021-08-11 NOTE — PROGRESS NOTES
Problem: Self Care Deficits Care Plan (Adult)  Goal: *Acute Goals and Plan of Care (Insert Text)  Description: Problem: Self Care Deficits Care Plan (Adult)  Goal: *Acute Goals and Plan of Care (Insert Text)  Note:   FUNCTIONAL STATUS PRIOR TO ADMISSION: Patient was independent and active without use of DME.      HOME SUPPORT: The patient lived with family but did not require assist.     Occupational Therapy Goals  Initiated 8/9/2021  1. Patient will perform shower transfer with modified independence within 7 day(s). 2.  Patient will perform grooming in standing with independence within 7 day(s). 3.  Patient will participate in standing balance exercise/activities with supervision/set-up for 10 minutes within 7 day(s). Outcome: Progressing Towards Goal   OCCUPATIONAL THERAPY TREATMENT  Patient: Hendrick Kawasaki (53 y.o. female)  Date: 8/11/2021  Diagnosis: TIA (transient ischemic attack) [G45.9]  Stroke (cerebrum) ([facility]) [I63.9] TIA (transient ischemic attack)       Precautions: Contact, Fall (droplet +)  Chart, occupational therapy assessment, plan of care, and goals were reviewed. ASSESSMENT  Patient continues with skilled OT services and is progressing towards goals. Today PT informed OTR that pt's balanced has spontaneously improved over last 48 hours. She is able to single leg stance hold. She still has an IV line in and OTR able to plastic wrap and tape the line for a shower and notified nursing through intercom system of pending shower. She demonstrated for OTR ability to step into the shower stall and agreed to take a shower including washing her hair. She was able to control the water, use grab bar for safe transfer with no difficulty with balance. Sit on a bedside commode frame to wash her hair and body. She also dried herself in standing and sitting, put on clean underpants and socks while seated. She had no pain or difficulty. She hopes to go home today.       Current Level of Function Impacting Discharge (ADLs): Pt was independent in showers today. Other factors to consider for discharge: lives with family who can supervise shower transfer if needed. PLAN :  Patient continues to benefit from skilled intervention to address the above impairments. Continue treatment per established plan of care to address goals. Recommend with staff: every other day shower    Recommend next OT session: if still here will work on activity tolerance    Recommendation for discharge: (in order for the patient to meet his/her long term goals)  No skilled occupational therapy/ follow up rehabilitation needs identified at this time. This discharge recommendation:  Has been made in collaboration with the attending provider and/or case management    IF patient discharges home will need the following DME: none       SUBJECTIVE:   Patient stated I haven't had a shower in a long time.     OBJECTIVE DATA SUMMARY:   Cognitive/Behavioral Status:  Neurologic State: Alert     Functional Mobility and Transfers for ADLs:  Bed Mobility:  Rolling: Independent  Supine to Sit: Independent  Sit to Supine: Independent  Scooting: Independent    Transfers:  Sit to Stand: Independent  Functional Transfers  Toilet Transfer : Modified independent  Shower Transfer: Independent       Balance:  Sitting: Intact  Sitting - Static: Good (unsupported)  Sitting - Dynamic: Good (unsupported)  Standing: Without support  Standing - Static: Unsupported;Good  Standing - Dynamic : Good; Unsupported    ADL Intervention:    Upper Body Bathing  Bathing Assistance: Supervision  Position Performed: Seated in chair    Lower Body Bathing  Bathing Assistance: Supervision  Perineal  : Supervision  Position Performed: Standing  Adaptive Equipment: Grab bar  Lower Body : Supervision  Position Performed: Seated in chair  [address]: Shower chair    Upper [address]: Minimum  assistance    Lower Body Dressing Assistance  Socks: Independent    PAin:  No complaints    Activity Tolerance:   Good    After treatment patient left in no apparent distress:   Sitting EOB, call button within reach    COMMUNICATION/COLLABORATION:   The patients plan of care was discussed with: Physical therapist and Rehabilitation technician.      Kd Polanco OT  Time Calculation: 25 mins

## 2021-08-11 NOTE — PROGRESS NOTES
Bedside shift change report given to OMA Caldwell RN (oncoming nurse) by [employer]. FRANSICO Guevara (offgoing nurse). Report included the following information SBAR and Kardex.

## 2021-08-11 NOTE — PROGRESS NOTES
IDR Team; MD, Nursing, Care Manager, Physical therapy, Nursing Supervisor, Pharmacy and Dietician, met to review patient's plan of care. Discussed goals, interventions, barriers and progress. Team will continue to monitor progress and report any concerns to the physician and care management as indicated. Transition of Care Plan:     PT worked with pt. Still some balance issues but it does not appear home health will be needed.

## 2021-08-11 NOTE — PROGRESS NOTES
Problem: Mobility Impaired (Adult and Pediatric)  Goal: *Acute Goals and Plan of Care (Insert Text)  Description: FUNCTIONAL STATUS PRIOR TO ADMISSION: Patient was independent and active without use of DME.    HOME SUPPORT PRIOR TO ADMISSION: The patient lived with family but did not require assist.    Physical Therapy Goals  Initiated 8/10/2021  1. Patient will move from supine to sit and sit to supine  in bed with independence within 7 day(s). 2.  Patient will transfer from bed to chair and chair to bed with modified independence using the least restrictive device within 7 day(s). 3.  Patient will perform sit to stand with modified independence within 7 day(s). 4.  Patient will ambulate with modified independence for 150 feet with the least restrictive device within 7 day(s). 5.  Patient will ascend/descend 13 stairs with one handrail(s) with modified independence within 7 day(s). Outcome: Progressing Towards Goal    PHYSICAL THERAPY TREATMENT  Patient: Shanice Reyes (83 y.o. female)  Date: 8/11/2021  Diagnosis: TIA (transient ischemic attack) [G45.9]  Stroke (cerebrum) ([facility]) [I63.9] TIA (transient ischemic attack)       Precautions: Contact, Fall (droplet +)  Chart, physical therapy assessment, plan of care and goals were reviewed. ASSESSMENT  Patient continues with skilled PT services and is progressing towards goals. She ambulates increased distance, demonstrates improving balance, and progresses to stair negotiation. SpO2 stable with activity using room air and she tolerates activity without complaints. .     Current Level of Function Impacting Discharge (mobility/balance): supervision    Other factors to consider for discharge: none          PLAN :  Patient continues to benefit from skilled intervention to address the above impairments. Continue treatment per established plan of care. to address goals.     Recommendation for discharge: (in order for the patient to meet his/her long term goals)  Outpatient physical therapy follow up recommended for high level balance; discussed with patient who states desire to return home and recover on her own. She states she will pursue outpatient PT if balance remains impaired following acute recovery. She states sister will also provide insight into her needs. This discharge recommendation:  Has not yet been discussed the attending provider and/or case management    IF patient discharges home will need the following DME: none       SUBJECTIVE:   Patient stated I only sleep a few hours at a time. I slept like normal. She reports ambulating to/from restroom frequently yesterday and this morning. Pt received supine, agreeable to PT and cleared by RN. OBJECTIVE DATA SUMMARY:   Critical Behavior:  Neurologic State: Alert  Orientation Level: Oriented X4  Cognition: Follows commands     Functional Mobility Training:  Bed Mobility:  Rolling: Independent  Supine to Sit: Independent  Sit to Supine: Independent  Scooting: Independent        Transfers:  Sit to Stand: Independent  Stand to Sit: Independent                             Balance:  Sitting: Without support  Sitting - Static: Good (unsupported)  Sitting - Dynamic: Good (unsupported)  Standing: Without support  Standing - Static: Good  Standing - Dynamic : Good  Ambulation/Gait Training:  Distance (ft): 130 Feet (ft)     Ambulation - Level of Assistance: Supervision;Modified independent                           No loss of balance, occasionally increased sway during R stance (mild), without overt loss of balance. Able to recover with ankle strategies only.                     Stairs:  Number of Stairs Trained: 13  Stairs - Level of Assistance: Supervision   Rail Use: Left     [place]on Balance Test:    Sitting to Standin  Standing Unsupported: 4  Sitting with Back Unsupported: 4  Standing to Sittin  Transfers: 4                    Alternate Foot on Step/Stool: 4      Stand on One Leg: 3 (improved significantly compared with yesterday) - single limb stance without support 6 seconds RLE and 7 seconds LLE     /56         56=Maximum possible score;   0-20=High fall risk  21-40=Moderate fall risk   41-56=Low fall risk               Pain Ratin/10 NPS    Activity Tolerance:   Good    SpO2 97%, HR 74 at rest using RA  SpO2 94%, HR 86 with activity using RA    After treatment patient left in no apparent distress:   Supine in bed, Call bell within reach, and Side rails x 3    COMMUNICATION/COLLABORATION:   The patients plan of care was discussed with: Occupational therapist and Registered nurse.      Chante Roberts, PT, DPT   Time Calculation: 25 mins

## 2021-08-11 NOTE — PROGRESS NOTES
The documentation for this period is being entered following the guidelines as defined in the [facility] downtime policy by Corina Booker LPN. Downtime was from 4623-7775.

## 2021-08-12 VITALS
TEMPERATURE: 96 F | RESPIRATION RATE: 16 BRPM | SYSTOLIC BLOOD PRESSURE: 125 MMHG | BODY MASS INDEX: 24.39 KG/M2 | OXYGEN SATURATION: 94 % | HEART RATE: 62 BPM | DIASTOLIC BLOOD PRESSURE: 66 MMHG | WEIGHT: 146.4 LBS | HEIGHT: 65 IN

## 2021-08-12 PROBLEM — I10 ESSENTIAL HYPERTENSION: Status: ACTIVE | Noted: 2021-08-12

## 2021-08-12 PROBLEM — E78.5 HYPERLIPIDEMIA: Chronic | Status: ACTIVE | Noted: 2021-08-12

## 2021-08-12 LAB
CRP SERPL-MCNC: 0.92 MG/DL (ref 0–0.6)
EST. AVERAGE GLUCOSE BLD GHB EST-MCNC: 123 MG/DL
HBA1C MFR BLD: 5.9 % (ref 4–5.6)

## 2021-08-12 PROCEDURE — 74011250637 HC RX REV CODE- 250/637: Performed by: INTERNAL MEDICINE

## 2021-08-12 PROCEDURE — 83036 HEMOGLOBIN GLYCOSYLATED A1C: CPT

## 2021-08-12 PROCEDURE — 74011250636 HC RX REV CODE- 250/636: Performed by: INTERNAL MEDICINE

## 2021-08-12 PROCEDURE — 36415 COLL VENOUS BLD VENIPUNCTURE: CPT

## 2021-08-12 PROCEDURE — 86140 C-REACTIVE PROTEIN: CPT

## 2021-08-12 RX ORDER — DEXAMETHASONE 6 MG/1
6 TABLET ORAL
Qty: 5 TABLET | Refills: 0 | Status: SHIPPED | OUTPATIENT
Start: 2021-08-12 | End: 2021-08-17

## 2021-08-12 RX ORDER — AMLODIPINE BESYLATE 5 MG/1
5 TABLET ORAL DAILY
Qty: 30 TABLET | Refills: 0 | Status: SHIPPED | OUTPATIENT
Start: 2021-08-12 | End: 2021-09-01 | Stop reason: SDUPTHER

## 2021-08-12 RX ORDER — GUAIFENESIN 100 MG/5ML
81 LIQUID (ML) ORAL DAILY
Status: SHIPPED | COMMUNITY
Start: 2021-08-13

## 2021-08-12 RX ORDER — ATORVASTATIN CALCIUM 40 MG/1
40 TABLET, FILM COATED ORAL
Qty: 30 TABLET | Refills: 0 | Status: SHIPPED | OUTPATIENT
Start: 2021-08-12 | End: 2021-09-01 | Stop reason: SDUPTHER

## 2021-08-12 RX ADMIN — DEXAMETHASONE 6 MG: 4 TABLET ORAL at 08:06

## 2021-08-12 RX ADMIN — Medication 10 ML: at 06:00

## 2021-08-12 RX ADMIN — ASPIRIN 81 MG 81 MG: 81 TABLET ORAL at 08:06

## 2021-08-12 NOTE — PROGRESS NOTES
[facility]  Hospitalist Progress Note    NAME: Reina Chapin   :  1950   MRN:  [id number]     Total duration of encounter: 3 days      Interim Hospital Summary: 73 y.o. female who presented on 2021 with TIA (transient ischemic attack). She has no past medical history on file. Jonane Cortez Has been on tx for HTN and HDL with PCP in [place].  Genice Box to  in March living with sister. Exposed to Covid about 2 weeks ago - in self quarantine. Presented to ED Sun following 20 min episode of R UE/LE paralysis. CT in ED was negative. Admitted for stroke w/u. Has been weak, SOB, coughing, anorexic, lost sense of taste - slowing improving. Multilple family members infected at family gathering on         Subjective:     Chief Complaint / Reason for Physician Visit  \"no c/o\".   Discussed with RN   Ambulating and eating much improved  No SOB, No O2 need  Feels she is fine for d/c    Review of Systems:  Symptom Y/N Comments  Symptom Y/N Comments   Fever/Chills n   Chest Pain n    Poor Appetite n  better  Edema n    Cough y   Abdominal Pain n    Sputum n   Joint Pain n    SOB/INGRAM n   Pruritis/Rash n    Nausea/vomit n   Tolerating PT/OT y    Diarrhea n   Tolerating Diet y    Constipation n   Other         Current Facility-Administered Medications:     atorvastatin (LIPITOR) tablet 40 mg, 40 mg, Oral, QHS, Matheus Barnes MD    sodium chloride (NS) flush 5-40 mL, 5-40 mL, IntraVENous, Q8H, Ruy BRITO MD, 10 mL at 21 0703    sodium chloride (NS) flush 5-40 mL, 5-40 mL, IntraVENous, PRN, Matheus Barnes MD, 10 mL at 21 1229    polyethylene glycol (MIRALAX) packet 17 g, 17 g, Oral, DAILY PRN, Matheus Barnes MD    aspirin chewable tablet 81 mg, 81 mg, Oral, DAILY, Matheus Barnes MD, 81 mg at 21 0852    dexAMETHasone (DECADRON) tablet 6 mg, 6 mg, Oral, DAILY, Matheus Barnes MD, 6 mg at 21 1812    acetaminophen (TYLENOL) tablet 650 mg, 650 mg, Oral, Q6H PRN, Matheus Barnes MD  [facility] ondansetron (ZOFRAN) injection 4 mg, 4 mg, IntraVENous, Q4H PRN, Donnie Godfrey MD    enoxaparin (LOVENOX) injection 40 mg, 40 mg, SubCUTAneous, Q12H, Donnie Godfrey MD, 40 mg at 08/11/21 2047    Objective:     VITALS:   Patient Vitals for the past 12 hrs:   Temp Pulse Resp BP SpO2   08/11/21 1729 97.2 °F (36.2 °C) 61 16 (!) 142/67 95 %   08/11/21 1228 96.8 °F (36 °C) 60 16 131/68 97 %     No intake or output data in the 24 hours ending 08/11/21 2212     PHYSICAL EXAM:  General: WD, Thin. Alert, cooperative, no acute distress    EENT:  EOMI. Anicteric sclerae. MMM  Resp:  Mild crackles, no wheezing. No accessory muscle use  CV:  Regular  rhythm,  No edema  GI:  Soft, Non distended, Non tender. +Bowel sounds  Neurologic:  Alert and oriented X 3, normal speech, nonfocal  Psych:   Not anxious nor agitated  Skin:  No rashes. No jaundice    LABS:  I reviewed today's most current labs and imaging studies. Pertinent labs include:  Recent Labs     08/09/21  0644   WBC 5.1   HGB 15.2   HCT 41.6        Recent Labs     08/10/21  0539 08/09/21  0644    137  137   K 4.3 3.8  3.7    103  103   CO2 24 25  25   * 103*  106*   BUN 10 13  12   CREA 0.58 0.59  0.59   CA 8.9 8.6  8.6   ALB  --  2.8*   TBILI  --  0.7   ALT  --  28       EKG: NSR 74 bpm, Normal     Radiology:  CXR 8/9:  Heterogeneous airspace opacities bilaterally. No pleural effusion or  pneumothorax. Heart size and mediastinal contours are normal. Osseous structures  are within normal limits.   IMPRESSION : Heterogeneous bilateral airspace opacities in keeping with COVID 19 infection.     CT HEAD 8/8:  The ventricles and sulci are appropriate in size and configuration for  age. No loss of gray-white differentiation to suggest late acute or early  subacute infarction.  No mass effect or intracranial hemorrhage.   There are air-fluid levels in the left maxillary, left ethmoid, and bilateral sphenoid sinuses.   IMPRESSION:  No acute intracranial abnormality.     MRI BRAIN 8/9:  There are multiple small acute infarcts in the left frontal and parietal lobes,  including in the parasagittal posterior left frontal lobe, involving both the  precentral and postcentral gyri, and in the left lateral frontal lobe.   The ventricles are normal in size and position. There are scattered  periventricular and deep white matter T2/FLAIR hyperintensities, consistent with  mild chronic microvascular ischemic disease. There is no acute hemorrhage,  extra-axial fluid collection, or mass effect. There is no cerebellar tonsillar  herniation. Expected arterial flow-voids are present.   Scattered mucosal thickening and air-fluid levels throughout the paranasal  sinuses, most pronounced in the bilateral sphenoid sinuses, bilateral ethmoidal  air cells and left maxillary sinus. The mastoid air cells and middle ears are  clear. The orbital contents are within normal limits. No significant osseous or  scalp lesions are identified.   IMPRESSION  1. Multiple small scattered acute infarcts in the left frontal and parietal lobes. 2. Mild chronic microvascular ischemic disease. 3. Pansinus mucosal thickening with scattered air-fluid levels, consistent with acute sinusitis.     ECHO 8/9:  · LV: Estimated LVEF is 65 - 70%. Visually measured ejection fraction. Normal cavity size, systolic function (ejection fraction normal) and diastolic function. Upper normal wall thickness. Age-appropriate left ventricular diastolic function. CAROTID DUPLEX 8/9:  Minimal stenosis of the right ICA. Intimal thickening. Mild (<50%) stenosis of the left ICA. Mild, heterogeneous and calcific plaque.     EKG 8/8: NSR 74 bpm      Procedures: see electronic medical records for all procedures/Xrays and details which were not copied into this note but were reviewed prior to creation of Plan. NEUROLOGY CONSULT 8/10:   The patient is a pleasant 80-year-old female who was admitted to the hospital with acute onset of right upper and lower extremity weakness with improvement in symptoms. She also does have upper respiratory symptoms and was diagnosed with Covid. MRI findings were notable for an acute left hemispheric stroke.     Acute stroke:  Her risk factors for stroke include hypertension and dyslipidemia and smoking  Mri was positive for an acute stroke. Continue asa daily. Dyslipidemia: - continue lipitor. LDL > 140.      Please check a hgba1c.     Htn: goal blood pressure under 140  Carotid doppler with no flow limiting stenosis. Echo completed. Continue pt and ot. Smoking cessation education was provided.        I provided stroke education today in regards to risk factors for stroke and lifestyle modifications to help minimize the risk of future stroke. This included medication compliance, regular follow up with primary care physician,  and healthy lifestyle habits (nutrition/exercise)     Covid pneumonia:  Currently on room air and improving      S. [address], DO Neurology        Assessment / Plan:    Principal Problem:    TIA (transient ischemic attack) (8/8/2021)     L Cerebral (Frontal / Parietal) CVA  20 min h/o R ext weakness / numbness PTA  No prior h/o vascular disease  Has been home in [place] - suspected positive follow exposure to nephew from [facility] 7/19 (whole family ill)  Concern with elevated D-dimer 11 range on admission  Have started on Lovenox 40mg SQ q12  ASA 81mg daily   Cont tx Statin - currently on Pravachol low dose, need switch to Lipitor 40mg today  Lipids , , HDL 36,   Appreciate recommendations Dr. Simran Becker     Active Problems:    COVID-19 (8/8/2021)  No respiratory symptoms  CXR c/w B viral pneumonia  Begin Solumedrol 6mg daily for 10 days, will d/c with additional 5d in AM  Lovenox bid, confer with Neuro best tx - no anticoagulation recommended except ASA 81mg  f/u CRP, Ferritin, BMP, CBC, LD.   Note d-dimer is down 3.5  CRP only mildly elevated 3.12  Monitor temp / oximetry - good  Droplet plus isolation for now, d/c on discharge (2 weeks was up 8/2)  Currently 3 weeks / 2 days post exposure       Lipids  Switch to Lipitor tonight       HTN  BP up intermittently  Will resume Norvasc     SAFETY:   Code Status:Full  DVT prophylaxis:Lovenox bid  Stress Ulcer prophylaxis: Pepcid  Bladder catheter:no  Family Contact Info:  Primary Emergency ContactStedereje Crabtree, Home Phone: [phone]  Bedded: [facility] Room ED08/08  Disposition: TBD, likely home in AM Thursday  Admission status:  Observation has been upgraded to Inpt with dx CVA  Reviewed most current lab test results and cultures  YES  Reviewed most current radiology test results   YES  Review and summation of old records today    NO  Reviewed patient's current orders and MAR    YES  PMH/SH reviewed - no change compared to H&P    Care Plan discussed with:                                   Comments  Patient x     Family      RN x     Care Manager  x     Consultant   x Dr. Virgilio Noonan team rounds were held today with , nursing, pharmacist and clinical coordinator. Patient's plan of care was discussed; medications were reviewed and discharge planning was addressed.         ____________________________________________    Total NON Critical Care TIME:  40   Minutes        Comments   >50% of visit spent in counseling and coordination of care   x      Signed: Brenda Dodge MD  [facility] Hospitalist  [phone]

## 2021-08-12 NOTE — DISCHARGE INSTRUCTIONS
Advance Care Planning  People with COVID-19 may have no symptoms, mild symptoms, such as fever, cough, and shortness of breath or they may have more severe illness, developing severe and fatal pneumonia. As a result, Advance Care Planning with attention to naming a health care decision maker (someone you trust to make healthcare decisions for you if you could not speak for yourself) and sharing other health care preferences is important BEFORE a possible health crisis. Please contact your Primary Care Provider to discuss Advance Care Planning. Preventing the Spread of Coronavirus Disease 2019 in Homes and Residential Communities  For the most recent information go to [employer]    Prevention steps for People with confirmed or suspected COVID-19 (including persons under investigation) who do not need to be hospitalized  and   People with confirmed COVID-19 who were hospitalized and determined to be medically stable to go home    Your healthcare provider and public health staff will evaluate whether you can be cared for at home. If it is determined that you do not need to be hospitalized and can be isolated at home, you will be monitored by staff from your local or state health department. You should follow the prevention steps below until a healthcare provider or local or state health department says you can return to your normal activities. Stay home except to get medical care  People who are mildly ill with COVID-19 are able to isolate at home during their illness. You should restrict activities outside your home, except for getting medical care. Do not go to work, school, or public areas. Avoid using public transportation, ride-sharing, or taxis. Separate yourself from other people and animals in your home  People: As much as possible, you should stay in a specific room and away from other people in your home.  Also, you should use a separate bathroom, if available. Animals: You should restrict contact with pets and other animals while you are sick with COVID-19, just like you would around other people. Although there have not been reports of pets or other animals becoming sick with COVID-19, it is still recommended that people sick with COVID-19 limit contact with animals until more information is known about the virus. When possible, have another member of your household care for your animals while you are sick. If you are sick with COVID-19, avoid contact with your pet, including petting, snuggling, being kissed or licked, and sharing food. If you must care for your pet or be around animals while you are sick, wash your hands before and after you interact with pets and wear a facemask. Call ahead before visiting your doctor  If you have a medical appointment, call the healthcare provider and tell them that you have or may have COVID-19. This will help the healthcare providers office take steps to keep other people from getting infected or exposed. Wear a facemask  You should wear a facemask when you are around other people (e.g., sharing a room or vehicle) or pets and before you enter a healthcare providers office. If you are not able to wear a facemask (for example, because it causes trouble breathing), then people who live with you should not stay in the same room with you, or they should wear a facemask if they enter your room. Cover your coughs and sneezes  Cover your mouth and nose with a tissue when you cough or sneeze. Throw used tissues in a lined trash can. Immediately wash your hands with soap and water for at least 20 seconds or, if soap and water are not available, clean your hands with an alcohol-based hand  that contains at least 60% alcohol.   Clean your hands often  Wash your hands often with soap and water for at least 20 seconds, especially after blowing your nose, coughing, or sneezing; going to the bathroom; and before eating or preparing food. If soap and water are not readily available, use an alcohol-based hand  with at least 60% alcohol, covering all surfaces of your hands and rubbing them together until they feel dry. Soap and water are the best option if hands are visibly dirty. Avoid touching your eyes, nose, and mouth with unwashed hands. Avoid sharing personal household items  You should not share dishes, drinking glasses, cups, eating utensils, towels, or bedding with other people or pets in your home. After using these items, they should be washed thoroughly with soap and water. Clean all high-touch surfaces everyday  High touch surfaces include counters, tabletops, doorknobs, bathroom fixtures, toilets, phones, keyboards, tablets, and bedside tables. Also, clean any surfaces that may have blood, stool, or body fluids on them. Use a household cleaning spray or wipe, according to the label instructions. Labels contain instructions for safe and effective use of the cleaning product including precautions you should take when applying the product, such as wearing gloves and making sure you have good ventilation during use of the product. Monitor your symptoms  Seek prompt medical attention if your illness is worsening (e.g., difficulty breathing). Before seeking care, call your healthcare provider and tell them that you have, or are being evaluated for, COVID-19. Put on a facemask before you enter the facility. These steps will help the healthcare providers office to keep other people in the office or waiting room from getting infected or exposed. Ask your healthcare provider to call the local or state health department. Persons who are placed under active monitoring or facilitated self-monitoring should follow instructions provided by their local health department or occupational health professionals, as appropriate. When working with your local health department check their available hours.   If you have a medical emergency and need to call 911, notify the dispatch personnel that you have, or are being evaluated for COVID-19. If possible, put on a facemask before emergency medical services arrive. Discontinuing home isolation  Patients with confirmed COVID-19 should remain under home isolation precautions until the risk of secondary transmission to others is thought to be low. The decision to discontinue home isolation precautions should be made on a case-by-case basis, in consultation with healthcare providers and state and local health departments. DISCHARGE SUMMARY from Nurse    PATIENT INSTRUCTIONS:      What to do at Home:  Recommended activity: Activity as tolerated,     If you experience any recurrent symptoms , please follow up with PCP or return to the ED. *  Please give a list of your current medications to your Primary Care Provider. *  Please update this list whenever your medications are discontinued, doses are      changed, or new medications (including over-the-counter products) are added. *  Please carry medication information at all times in case of emergency situations. These are general instructions for a healthy lifestyle:    No smoking/ No tobacco products/ Avoid exposure to second hand smoke  Surgeon General's Warning:  Quitting smoking now greatly reduces serious risk to your health. Obesity, smoking, and sedentary lifestyle greatly increases your risk for illness    A healthy diet, regular physical exercise & weight monitoring are important for maintaining a healthy lifestyle    You may be retaining fluid if you have a history of heart failure or if you experience any of the following symptoms:  Weight gain of 3 pounds or more overnight or 5 pounds in a week, increased swelling in our hands or feet or shortness of breath while lying flat in bed. Please call your doctor as soon as you notice any of these symptoms; do not wait until your next office visit.         The discharge information has been reviewed with the patient. The patient verbalized understanding. Discharge medications reviewed with the patient and appropriate educational materials and side effects teaching were provided.   ___________________________________________________________________________________________________________________________________

## 2021-08-12 NOTE — PROGRESS NOTES
Problem: Falls - Risk of  Goal: *Absence of Falls  Description: Document Lisa Justin Fall Risk and appropriate interventions in the flowsheet. Outcome: Progressing Towards Goal  Note: Fall Risk Interventions:  Mobility Interventions: Patient to call before getting OOB         Medication Interventions: Bed/chair exit alarm, Patient to call before getting OOB, Teach patient to arise slowly    Elimination Interventions: Call light in reach, Patient to call for help with toileting needs, Stay With Me (per policy)    History of Falls Interventions: Room close to nurse's station         Problem: Patient Education: Go to Patient Education Activity  Goal: Patient/Family Education  Outcome: Progressing Towards Goal     Problem: Risk for Spread of Infection  Goal: Prevent transmission of infectious organism to others  Description: Prevent the transmission of infectious organisms to other patients, staff members, and visitors. Outcome: Progressing Towards Goal     Problem: Patient Education:  Go to Education Activity  Goal: Patient/Family Education  Outcome: Progressing Towards Goal     Problem: Airway Clearance - Ineffective  Goal: Achieve or maintain patent airway  Outcome: Progressing Towards Goal     Problem: Gas Exchange - Impaired  Goal: Absence of hypoxia  Outcome: Progressing Towards Goal  Goal: Promote optimal lung function  Outcome: Progressing Towards Goal     Problem: Breathing Pattern - Ineffective  Goal: Ability to achieve and maintain a regular respiratory rate  Outcome: Progressing Towards Goal     Problem:  Body Temperature -  Risk of, Imbalanced  Goal: Ability to maintain a body temperature within defined limits  Outcome: Progressing Towards Goal  Goal: Complications related to the disease process, condition or treatment will be avoided or minimized  Outcome: Progressing Towards Goal     Problem: Isolation Precautions - Risk of Spread of Infection  Goal: Prevent transmission of infectious organism to others  Outcome: Progressing Towards Goal     Problem: Nutrition Deficits  Goal: Optimize nutrtional status  Outcome: Progressing Towards Goal     Problem: Risk for Fluid Volume Deficit  Goal: Maintain normal heart rhythm  Outcome: Progressing Towards Goal  Goal: Maintain absence of muscle cramping  Outcome: Progressing Towards Goal  Goal: Maintain normal serum potassium, sodium, calcium, phosphorus, and pH  Outcome: Progressing Towards Goal     Problem: Loneliness or Risk for Loneliness  Goal: Demonstrate positive use of time alone when socialization is not possible  Outcome: Progressing Towards Goal     Problem: Fatigue  Goal: Verbalize increase energy and improved vitality  Outcome: Progressing Towards Goal     Problem: Patient Education: Go to Patient Education Activity  Goal: Patient/Family Education  Outcome: Progressing Towards Goal     Problem: Hypertension  Goal: *Blood pressure within specified parameters  Outcome: Progressing Towards Goal  Goal: *Fluid volume balance  Outcome: Progressing Towards Goal  Goal: *Labs within defined limits  Outcome: Progressing Towards Goal

## 2021-08-12 NOTE — PROGRESS NOTES
Bedside shift change report given to Apurva Watkins RN for Deya Lennon RN (oncoming nurse) by Oswald Stevenson RN   (offgoing nurse). Jo-Ann Arita said she had no questions or concerns   Report included the following information SBAR, Kardex, Intake/Output, MAR and Recent Results. Teresa score 2  Bed/chair alarm is not in use. If in use it is set at the highest volume. Intravenous fluids were administered, none 0 ml/hr  Patient Vitals for the past 12 hrs:   Temp Pulse Resp BP SpO2   08/11/21 1729 97.2 °F (36.2 °C) 61 16 (!) 142/67 95 %   08/11/21 1228 96.8 °F (36 °C) 60 16 131/68 97 %   08/11/21 0849 (!) 96.5 °F (35.8 °C) (!) 54 20 (!) 141/71 96 %     No flowsheet data found. Lab results reviewed. For significant abnormal values and values requiring intervention, see assessment and plan.

## 2021-08-12 NOTE — DISCHARGE SUMMARY
[facility]  Hospitalist Discharge Summary    Patient ID:  Svetlana Lopez  [id number]  63 y.o.  1950    PCP on record: Arianna Rios MD    Admit date: 8/8/2021  Discharge date and time: 8/12/2021     DISCHARGE DIAGNOSIS:    Principal Problem:    Cerebral infarction, left hemisphere [facility]) (8/10/2021)    Active Problems:    COVID-19 (8/8/2021)    Essential hypertension (8/12/2021)    Hyperlipidemia (8/12/2021)      CONSULTATIONS:  NEUROLOGY:  Dr. Lester Ni    Excerpted HPI from H&P of Willem Nunez MD:  73 y.o. female presenting for admission to [facility] for further evaluation and treatment for TIA (transient ischemic attack). She  has no past medical history on file. . She reported the onset of R UE/LE weakness 3 hours PTA lasting about 20 minutes getting up from bed where she was resting watching a movie. She notes her hand dropped, legs were week and she fell to ground. Has h/o Cigs / HTN / HDL. No h/o DM. Pt is a retired . No prior h/o vascular disease. No significant COPD     Moved to area in March to be close to her sister (from [place]). Nephew came up from [facility] for his birthday and became ill and was later dx with COVID. His father was admitted earlier with Covid Pneum. Pt has been ill with cough, weakness, anorexia, no taste, extreme weakness. Pt denies any focal weakness until this afternoon PTA. She has note some cough, but no SOB / wheezing. Has never required bronchodilator or oxygen.      ED w/u was noted for neg CT HEAD. Pt was admitted to [facility] for Vascular w/u    ______________________________________________________________________  DISCHARGE SUMMARY/HOSPITAL COURSE:  for full details see H&P, daily progress notes, labs, consult notes.      Principal Problem:     L Cerebral (Frontal / Parietal) CVA  20 min h/o R ext weakness / numbness PTA  No prior h/o vascular disease  Has been home in [place] - suspected positive follow exposure to nephew from [place] 7/19 (whole family ill)  Concern with elevated D-dimer 11 range on admission  Have started on Lovenox 40mg SQ q12  ASA 81mg daily   Cont tx Statin - currently on Pravachol low dose, need switch to Lipitor 40mg today  Lipids , , HDL 36,   Appreciate recommendations Dr. Jacobo Holder     Active Problems:    COVID-19 (8/8/2021)  No respiratory symptoms  CXR c/w B viral pneumonia  Begin Solumedrol 6mg daily for 10 days, will d/c with additional 5d in AM  Lovenox bid, confer with Neuro best tx - no anticoagulation recommended except ASA 81mg  f/u CRP, Ferritin, BMP, CBC, LD. Note d-dimer is down 3.5  CRP only mildly elevated 3.12, f/u in d/c  Monitor temp / oximetry - good  Droplet plus isolation for now, d/c on discharge (2 weeks was up 8/2)  Currently 3 weeks / 2 days post exposure       HYPERLIPIDEMIA  Switch to Lipitor tonight  Was tx low dose Pravachol PTA       HTN  BP up intermittently  Will resume Norvasc, which was taken PTA       _______________________________________________________________________  Patient seen and examined by me on discharge day. Pertinent Findings:  Visit Vitals  /66 (BP 1 Location: Left upper arm, BP Patient Position: At rest)   Pulse 62   Temp (!) 96 °F (35.6 °C)   Resp 16   Ht 5' 5\" (1.651 m)   Wt 66.4 kg (146 lb 6.4 oz)   SpO2 94%   BMI 24.36 kg/m²     Gen:    Not in distress  Chest: Nonlabored respiration, Clear lungs  CVS:   Regular rhythm.   No edema  Abd:  Soft, not distended, not tender  Neuro:  Alert, nonfocal, weak    LABS:  Results for Zane Rene (MRN [id number]) as of 8/12/2021 08:30   8/8/2021 16:47 8/9/2021 06:44   WBC 6.8 5.1   NRBC 0.0 0.0   RBC 4.94 4.50   HGB 16.4 (H) 15.2   HCT 46.0 41.6   MCV 93.1 92.4   MCH 33.2 33.8   MCHC 35.7 36.5   RDW 12.4 12.4   PLATELET 881 153   MPV 9.4 9.7   NEUTROPHILS 58 50   LYMPHOCYTE 27 31   MONOCYTES 13 16 (H)   EOSINOPHILS 1 2   BASOPHILS 1 1     Results for Zane Rene (MRN [id number]) as of 8/12/2021 08:30 8/8/2021 16:47 8/8/2021 19:15 8/10/2021 05:39   INR 1.1     Pro time 10.9     D-dimer  11.47 (H) 3.54 (H)     Results for Cheo Pimentel (MRN [id number]) as of 8/12/2021 08:30   8/8/2021 16:47 8/9/2021 06:44 8/9/2021 06:44 8/10/2021 05:39   Sodium 134 (L) 137 137 140   Potassium 4.0 3.7 3.8 4.3   Chloride 101 103 103 105   CO2 23 25 25 24   Anion gap 10 9 9 11   Glucose 107 (H) 106 (H) 103 (H) 164 (H)   BUN 15 12 13 10   Creatinine 0.73 0.59 0.59 0.58   BUN/Cr ratio 21 (H) 20 22 (H) 17   Calcium 9.0 8.6 8.6 8.9   GFR non-AA >60 >60 >60 >60   Bilirubin, total 0.8  0.7    Protein, total 7.7  7.1    Albumin 3.2 (L)  2.8 (L)    Globulin 4.5 (H)  4.3 (H)    A-G Ratio 0.7 (L)  0.7 (L)    ALT 32  28    AST 37  34    Alk. phos 106  95    LD    350 (H)   Triglyceride  116     Chol, total  203 (H)     HDL Chol  36     CHOL/HDL   5.6 (H)     VLDL, calc  23.2     LDL, calc  143.8 (H)     Troponin-I, Qt. <0.05      Ferritin  1,303 (H)     C-R protein    3.12 (H)     SARS-CoV-2 NOTD   DetectedPanic     Comment: Positive results are indicative of the presence of SARS-CoV-2.  Clinical correlation with patient history and other diagnostic information is necessary to determine patient infection status.  Positive results do not rule out bacterial infection or co-infection with other pathogens. CALLED TO AND READ BACK BY   [facility] RN AT 8456 BY [facility]    Influenza A by PCR NOTD   Not detected    Influenza B by PCR NOTD   Not detected        EKG: NSR 74 bpm, Normal     Radiology:  CXR 8/9:  Heterogeneous airspace opacities bilaterally. No pleural effusion or  pneumothorax. Heart size and mediastinal contours are normal. Osseous structures  are within normal limits.   IMPRESSION : Heterogeneous bilateral airspace opacities in keeping with COVID 19 infection.     CT HEAD 8/8:  The ventricles and sulci are appropriate in size and configuration for  age.  No loss of gray-white differentiation to suggest late acute or early  subacute infarction. No mass effect or intracranial hemorrhage.   There are air-fluid levels in the left maxillary, left ethmoid, and bilateral sphenoid sinuses.   IMPRESSION:  No acute intracranial abnormality.     MRI BRAIN 8/9:  There are multiple small acute infarcts in the left frontal and parietal lobes,  including in the parasagittal posterior left frontal lobe, involving both the  precentral and postcentral gyri, and in the left lateral frontal lobe.   The ventricles are normal in size and position. There are scattered  periventricular and deep white matter T2/FLAIR hyperintensities, consistent with  mild chronic microvascular ischemic disease. There is no acute hemorrhage,  extra-axial fluid collection, or mass effect. There is no cerebellar tonsillar  herniation. Expected arterial flow-voids are present.   Scattered mucosal thickening and air-fluid levels throughout the paranasal  sinuses, most pronounced in the bilateral sphenoid sinuses, bilateral ethmoidal  air cells and left maxillary sinus. The mastoid air cells and middle ears are  clear. The orbital contents are within normal limits. No significant osseous or  scalp lesions are identified.   IMPRESSION  1. Multiple small scattered acute infarcts in the left frontal and parietal lobes. 2. Mild chronic microvascular ischemic disease. 3. Pansinus mucosal thickening with scattered air-fluid levels, consistent with acute sinusitis.     ECHO 8/9:  · LV: Estimated LVEF is 65 - 70%. Visually measured ejection fraction. Normal cavity size, systolic function (ejection fraction normal) and diastolic function. Upper normal wall thickness. Age-appropriate left ventricular diastolic function.     CAROTID DUPLEX 8/9:  Minimal stenosis of the right ICA. Intimal thickening. Mild (<50%) stenosis of the left ICA. Mild, heterogeneous and calcific plaque.     EKG 8/8: NSR 74 bpm    NEURO CONSULT 8/10:   The patient is a pleasant 59-year-old female who was admitted to the hospital with acute onset of right upper and lower extremity weakness with improvement in symptoms. She also does have upper respiratory symptoms and was diagnosed with Covid. MRI findings were notable for an acute left hemispheric stroke.     Acute stroke:  Her risk factors for stroke include hypertension and dyslipidemia and smoking  Mri was positive for an acute stroke. Continue asa daily. Dyslipidemia: - continue lipitor. LDL > 140.    Please check a hgba1c.   Htn: goal blood pressure under 140  Carotid doppler with no flow limiting stenosis. Echo completed. Continue pt and ot. Smoking cessation education was provided.   I provided stroke education today in regards to risk factors for stroke and lifestyle modifications to help minimize the risk of future stroke. This included medication compliance, regular follow up with primary care physician,  and healthy lifestyle habits (nutrition/exercise)   Covid pneumonia:  Currently on room air and improving  S. [address], DO  Neurology    _______________________________________________________________________  DISCHARGE MEDICATIONS:   Current Discharge Medication List      START taking these medications    Details   aspirin 81 mg chewable tablet Take 1 Tablet by mouth daily. Start date: 8/13/2021      dexAMETHasone (DECADRON) 6 mg tablet Take 1 Tablet by mouth Daily (before breakfast) for 5 days. Qty: 5 Tablet, Refills: 0  Start date: 8/12/2021, End date: 8/17/2021      atorvastatin (LIPITOR) 40 mg tablet Take 1 Tablet by mouth nightly. Qty: 30 Tablet, Refills: 0  Start date: 8/12/2021         CONTINUE these medications which have CHANGED    Details   amLODIPine (NORVASC) 5 mg tablet Take 1 Tablet by mouth daily.   Qty: 30 Tablet, Refills: 0  Start date: 8/12/2021         STOP taking these medications       pravastatin (PRAVACHOL) 20 mg tablet Comments:   Reason for Stopping:               My Recommended  Diet: Cardiac  Activity: Ad Nancy  Wound Care: none  Follow-up labs: routine    ______________________________________________________________________  DISPOSITION:    Home with Family: x   Home with HH/PT/OT/RN:    SNF/LTC:    JC:    OTHER:        Condition at Discharge:  Stable  _____________________________________________________________________  Follow up with:   PCP : Arianna Rios MD  Follow-up Information     Follow up With Specialties Details Why Contact Info    Carmen Akhtar DO Internal Medicine, Pediatric Medicine In 2 weeks  [place] [address]  [address] [phone]            Total time in minutes spent coordinating this discharge (includes going over instructions, follow-up, prescriptions, and preparing report for sign off to her PCP) :35 minutes    Signed:  Marvin Moulton MD  [facility] Hospitalist  [phone]

## 2021-08-12 NOTE — PROGRESS NOTES
Problem: Falls - Risk of  Goal: *Absence of Falls  Description: Document Jean Beal Fall Risk and appropriate interventions in the flowsheet. Outcome: Progressing Towards Goal  Note: Fall Risk Interventions:  Mobility Interventions: Patient to call before getting OOB         Medication Interventions: Teach patient to arise slowly, Patient to call before getting OOB    Elimination Interventions: Call light in reach, Patient to call for help with toileting needs, Toilet paper/wipes in reach    History of Falls Interventions: Room close to nurse's station         Problem: Patient Education: Go to Patient Education Activity  Goal: Patient/Family Education  Outcome: Progressing Towards Goal     Problem: Risk for Spread of Infection  Goal: Prevent transmission of infectious organism to others  Description: Prevent the transmission of infectious organisms to other patients, staff members, and visitors. Outcome: Progressing Towards Goal     Problem: Patient Education:  Go to Education Activity  Goal: Patient/Family Education  Outcome: Progressing Towards Goal     Problem: Airway Clearance - Ineffective  Goal: Achieve or maintain patent airway  Outcome: Progressing Towards Goal     Problem: Gas Exchange - Impaired  Goal: Absence of hypoxia  Outcome: Progressing Towards Goal  Goal: Promote optimal lung function  Outcome: Progressing Towards Goal     Problem: Breathing Pattern - Ineffective  Goal: Ability to achieve and maintain a regular respiratory rate  Outcome: Progressing Towards Goal     Problem: Body Temperature -  Risk of, Imbalanced  Goal: Ability to maintain a body temperature within defined limits  Outcome: Progressing Towards Goal  Goal: Complications related to the disease process, condition or treatment will be avoided or minimized  Outcome: Progressing Towards Goal     Problem:  Body Temperature -  Risk of, Imbalanced  Goal: Ability to maintain a body temperature within defined limits  Outcome: Progressing Towards Goal  Goal: Complications related to the disease process, condition or treatment will be avoided or minimized  Outcome: Progressing Towards Goal     Problem: Isolation Precautions - Risk of Spread of Infection  Goal: Prevent transmission of infectious organism to others  Outcome: Progressing Towards Goal     Problem: Nutrition Deficits  Goal: Optimize nutrtional status  Outcome: Progressing Towards Goal     Problem: Risk for Fluid Volume Deficit  Goal: Maintain normal heart rhythm  Outcome: Progressing Towards Goal  Goal: Maintain absence of muscle cramping  Outcome: Progressing Towards Goal  Goal: Maintain normal serum potassium, sodium, calcium, phosphorus, and pH  Outcome: Progressing Towards Goal     Problem: Loneliness or Risk for Loneliness  Goal: Demonstrate positive use of time alone when socialization is not possible  Outcome: Progressing Towards Goal     Problem: Fatigue  Goal: Verbalize increase energy and improved vitality  Outcome: Progressing Towards Goal     Problem: Patient Education: Go to Patient Education Activity  Goal: Patient/Family Education  Outcome: Progressing Towards Goal     Problem: Hypertension  Goal: *Blood pressure within specified parameters  Outcome: Progressing Towards Goal  Goal: *Fluid volume balance  Outcome: Progressing Towards Goal  Goal: *Labs within defined limits  Outcome: Progressing Towards Goal

## 2021-08-12 NOTE — ROUTINE PROCESS
Discharge instructions reviewed with patient  Personal belongings returned: n/a  Home meds returned: n/a  To front entrance via wheelchair  Discharged home with  sister @ [phone]. Stroke Education provided to patient and the following topics were discussed    1. Patients personal risk factors for stroke are hypertension and hyperlipidemia    2. Warning signs of Stroke:        * Sudden numbness or weakness of the face, arm or leg, especially on one side of          The body            * Sudden confusion, trouble speaking or understanding        * Sudden trouble seeing in one or both eyes        * Sudden trouble walking, dizziness, loss of balance or coordination        * Sudden severe headache with no known cause      3. Importance of activation Emergency Medical Services ( 9-1-1 ) immediately if experience any warning signs of stroke. 4. Be sure and schedule a follow-up appointment with your primary care doctor or any specialists as instructed. 5. You must take medicine every day to treat your risk factors for stroke. Be sure to take your medicines exactly as your doctor tells you: no more, no less. Know what your medicines are for , what they do. Anti-thrombotics /anticoagulants can help prevent strokes. You are taking the following medicine(s)  ASA     6. Smoking and second-hand smoke greatly increase your risk of stroke, cardiovascular disease and death. Smoking history cigarettes, - she is going to attempt to quit    7. Information provided was BS Stroke Education Binder    8. Documentation of teaching completed in Patient Education Activity and on Care Plan with teaching response noted?   yes

## 2021-08-13 NOTE — PROGRESS NOTES
Transition of Care ([facility]) Plan:  Patient discharged home to her sister's home where she is staying at present. No needs identified at this time. RUR: 9%  LOW    [facility] Transportation:      How is patient being transported at discharge? Family/POV    When? 8/12/21    Is transport scheduled? NA    Follow-up appointment and transportation:    PCP? Zacarias Nixon NP (Dr. Maritza Knapp)     Specialist?     Time/Date? 8/23/21  0930    Who is transporting to the follow-up appointment? Family    Is transport for follow up appointment scheduled? NA    Communication plan (with patient/family): Who is being called? Patient     What number(s) is to be used? [phone]    What service provider is calling for [facility] services? PCP office    When are they calling? Probably 24 - 48 hours prior to appointment. Click here to complete [address] including selection of the Healthcare Decision Maker Relationship (ie \"Primary\")  [email or web address]    Patient named her daughter, Joey Eubanks as [address] but could not remember her number. Care Management Interventions  PCP Verified by CM: Yes MD Cynthia Braswell MD)  Last Visit to PCP: 02/11/21  Palliative Care Criteria Met (RRAT>21 & CHF Dx)?: No (No MD order for Palliative Care)  Mode of Transport at Discharge: Other (see comment) (POV/Family)  Transition of Care Consult (CM Consult): Discharge Planning  Discharge Durable Medical Equipment: No  Physical Therapy Consult: Yes  Occupational Therapy Consult: Yes  Speech Therapy Consult: No  Current Support Network:  Other (Patient moved from [facility] Shah [facility] to live with her sister Laura Hernandez)  Confirm Follow Up Transport: [address] Provided?: No  Discharge Location  Discharge Placement: Home

## 2021-08-15 NOTE — PROGRESS NOTES
Thelma Pepe is a 73 y.o. female who presents today to establish care in the area   with c/o   Chief Complaint   Patient presents with   [facility] Follow Up    Neurologic Problem     She recently moved to the [facility] from [place] and wanted to be closer to family. Today she is following up after a recent hospitalization at [facility] from (8/8/21-8/12/21). During her hospitalization she was diagnosed with a left hemisphere cerebral infarction. This was confirmed on MRI 8/9/2021. During her hospitalization she also had an ECHO on 8/9 which showed a normal ejection fraction, LVEF 65-70%. Her Carotid Duplex on 8/9 showed minimal stenosis of right ICA. Mild (<50%) stenosis of the left ICA. EKG 8/8: NSR 74 bpm  Neurology (DO Anny) was consulted and recommended continuing the ASA daily. Continue lipitor (LDL>140), goal B/P under <140, lifestyle modifications to help minimize the risk of future stroke. Including, medication compliance, regular follow up with primary care and healthy lifestyle habits. She was also diagnosed with COVID on her chest xray. Today, she denies cough, SOB, CP. She reports feeling good. She has a history of cigarrette smoking. She has not smoked for almost a month now, denies any cravings. She also has a history of HTN, and HLD. Denies diabetes, upon chart review her HgA1C is mildly elevated at 5.9% while in the hospital. She denies prior history of vascular disease. No significant COPD. She is a retired . Hypertension  She is on 5mg amlodipine. Denies CP/SOB. HLD  She was previously taking pravastatin and was started on atorvastatin 40mg during hospitalization.   Lab Results   Component Value Date/Time    Cholesterol, total 203 (H) 08/09/2021 06:44 AM    HDL Cholesterol 36 08/09/2021 06:44 AM    LDL, calculated 143.8 (H) 08/09/2021 06:44 AM    VLDL, calculated 23.2 08/09/2021 06:44 AM    Triglyceride 116 08/09/2021 06:44 AM    CHOL/HDL Ratio 5.6 (H) 08/09/2021 06:44 AM     Prediabetes:  Last A1C 5.9 on 8/12/21. Hemachromatosis:  She mentions having a history of hemachromatosis. Reports she was diagnosed about 6 years ago. States she had two phlebotomy treatments in the past, but has not needed any treatments in over 6 years. CBC was unremarkable while in the hospital a few weeks ago. Upon chart review, patients ferritin was checked 8/2021 and was 1303. Health Maintenance:  Hepatitis C Screening: declined at this time  Covid 19 Vaccine: She is looking into getting the COVID vaccine at [facility] or [facility]  DTAP: declined   Colonoscopy:  declined  Shingrix: declined  Mammogram: declined  Dexa: declined  PNA Vaccine: declined    Assessment/ Plan:     HTN:  Cont Amlodipine 5mg daily  Low-sodium diet  Exercise  RTO or go to ER for any CP, SOB, dizziness, or swelling. F/U:  6 months, will recheck labs at this appt    HLD:  Cont Atorvastatin 40mg daily  Low fat diet  F/U: 6 months, will recheck labs at this appt    Prediabetes:  Last A1C 5.8  Diet-low sugar and low carbohydrate diet  Continue exercise  Continue Atorvastatin  Recheck A1C and CMP in 6 months    Post Covid-19 Condition:  Denies SOB/cough/CP    History of CVA (Cerebrovascular Accident)  Cont ASA daily  Continue Atorvastatin   Stop smoking  Exercise and low fat diet  Goal B/P <140  Go to the ER or call 911 if you experience sudden lower or upper extremity weakness, inability to speak or smile  F/U in 6 months    Hemachromatosis  Will place referral to Dr. Woody Chin  F/U in 6 months    Health Maintenance  Covid vaccine: Discussed going to [facility] or [facility] to get the Covid vaccine.  She verbalizes understanding    Subjective:         Patient Active Problem List   Diagnosis Code    TIA (transient ischemic attack) G45.9    COVID-19 U07.1    Cerebral infarction, left hemisphere ([facility] I63.9    Essential hypertension I10    Hyperlipidemia E78.5       Past Medical History: Diagnosis Date    HTN (hypertension)     [facility]          Current Outpatient Medications:     aspirin 81 mg chewable tablet, Take 1 Tablet by mouth daily. , Disp: , Rfl:     atorvastatin (LIPITOR) 40 mg tablet, Take 1 Tablet by mouth nightly., Disp: 30 Tablet, Rfl: 0    amLODIPine (NORVASC) 5 mg tablet, Take 1 Tablet by mouth daily. , Disp: 30 Tablet, Rfl: 0    Allergies   Allergen Reactions    Lisinopril Cough     Note:  Allergy information obtained from PCP's office       History reviewed. No pertinent surgical history. Social History     Tobacco Use   Smoking Status Former Smoker    Packs/day: 1.00    Years: 50.00    Pack years: 50.00    Quit date: 2021    Years since quittin.0   Smokeless Tobacco Never Used       Social History     Socioeconomic History    Marital status:      Spouse name: Not on file    Number of children: Not on file    Years of education: Not on file    Highest education level: Not on file   Tobacco Use    Smoking status: Former Smoker     Packs/day: 1.00     Years: 50.00     Pack years: 50.00     Quit date: 2021     Years since quittin.0    Smokeless tobacco: Never Used   Substance and Sexual Activity    Alcohol use: Yes     Alcohol/week: 3.0 - 4.0 standard drinks     Types: 3 - 4 Glasses of wine per week    Drug use: Never     Social Determinants of Health     Financial Resource Strain:     Difficulty of Paying Living Expenses:    Food Insecurity:     Worried About Running Out of Food in the Last Year:     [address] in the Last Year:    Transportation Needs:     Lack of Transportation (Medical):      Lack of Transportation (Non-Medical):    Physical Activity:     Days of Exercise per Week:     Minutes of Exercise per Session:    Stress:     Feeling of Stress :    Social Connections:     Frequency of Communication with Friends and Family:     Frequency of Social Gatherings with Friends and Family:     Attends [employer] Services:     Active Member of Clubs or Organizations:     Attends Club or Organization Meetings:     Marital Status:        Family History   Problem Relation Age of Onset    Diabetes Mother     Diabetes Sister     Cancer Brother     Lung Cancer Brother        ROS:  Review of Systems   Constitutional: Negative for chills, fever and weight loss. HENT: Negative for ear discharge, ear pain and sinus pain. Eyes: Negative for blurred vision, double vision, pain and discharge. Respiratory: Negative for cough, hemoptysis, shortness of breath and wheezing. Cardiovascular: Negative for chest pain and palpitations. Gastrointestinal: Negative for abdominal pain, diarrhea, nausea and vomiting. Genitourinary: Negative for dysuria, flank pain, frequency and hematuria. Musculoskeletal: Negative for back pain, falls, joint pain and myalgias. Skin: Negative for rash. Neurological: Negative for dizziness, speech change, loss of consciousness, weakness and headaches. Endo/Heme/Allergies: Does not bruise/bleed easily. Psychiatric/Behavioral: Negative for depression, hallucinations and suicidal ideas. Objective:     Visit Vitals  /67 (BP 1 Location: Left arm, BP Patient Position: Sitting)   Pulse 69   Temp 96.8 °F (36 °C) (Temporal)   Resp 18   Ht 5' 5\" (1.651 m)   Wt 153 lb 2 oz (69.5 kg)   SpO2 98%   BMI 25.48 kg/m²     Body mass index is 25.48 kg/m². Physical Exam  Constitutional:       General: She is awake. Appearance: Normal appearance. She is well-developed, well-groomed and normal weight. She is not ill-appearing. HENT:      Head: Normocephalic and atraumatic. Nose: Nose normal.      Mouth/Throat:      Pharynx: Oropharynx is clear. Eyes:      Extraocular Movements: Extraocular movements intact. Conjunctiva/sclera: Conjunctivae normal.      Pupils: Pupils are equal, round, and reactive to light.    Cardiovascular:      Rate and Rhythm: Normal rate and regular rhythm. Pulses: Normal pulses. Heart sounds: Normal heart sounds. No murmur heard. No friction rub. Pulmonary:      Effort: Pulmonary effort is normal.      Breath sounds: Normal breath sounds. No wheezing. Abdominal:      General: Bowel sounds are normal.      Palpations: Abdomen is soft. Tenderness: There is no abdominal tenderness. There is no guarding. Musculoskeletal:         General: Normal range of motion. Cervical back: Normal range of motion. Skin:     General: Skin is warm and dry. Capillary Refill: Capillary refill takes less than 2 seconds. Neurological:      General: No focal deficit present. Mental Status: She is alert and oriented to person, place, and time. Mental status is at baseline. Psychiatric:         Mood and Affect: Mood normal.         Behavior: Behavior normal. Behavior is cooperative. Thought Content: Thought content normal.         Judgment: Judgment normal.           Results for orders placed or performed during the hospital encounter of 08/08/21   CBC WITH AUTOMATED DIFF   Result Value Ref Range    WBC 6.8 3.6 - 11.0 K/uL    RBC 4.94 3.80 - 5.20 M/uL    HGB 16.4 (H) 11.5 - 16.0 g/dL    HCT 46.0 35.0 - 47.0 %    MCV 93.1 80.0 - 99.0 FL    MCH 33.2 26.0 - 34.0 PG    MCHC 35.7 30.0 - 36.5 g/dL    RDW 12.4 11.5 - 14.5 %    PLATELET 673 553 - 367 K/uL    MPV 9.4 8.9 - 12.9 FL    NRBC 0.0 0  WBC    ABSOLUTE NRBC 0.00 0.00 - 0.01 K/uL    NEUTROPHILS 58 32 - 75 %    LYMPHOCYTES 27 12 - 49 %    MONOCYTES 13 5 - 13 %    EOSINOPHILS 1 0 - 7 %    BASOPHILS 1 0 - 1 %    IMMATURE GRANULOCYTES 0 0.0 - 0.5 %    ABS. NEUTROPHILS 4.0 1.8 - 8.0 K/UL    ABS. LYMPHOCYTES 1.8 0.8 - 3.5 K/UL    ABS. MONOCYTES 0.9 0.0 - 1.0 K/UL    ABS. EOSINOPHILS 0.1 0.0 - 0.4 K/UL    ABS. BASOPHILS 0.1 0.0 - 0.1 K/UL    ABS. IMM.  GRANS. 0.0 0.00 - 0.04 K/UL    DF AUTOMATED     METABOLIC PANEL, COMPREHENSIVE   Result Value Ref Range    Sodium 134 (L) 136 - 145 mmol/L Potassium 4.0 3.5 - 5.1 mmol/L    Chloride 101 97 - 108 mmol/L    CO2 23 21 - 32 mmol/L    Anion gap 10 5 - 15 mmol/L    Glucose 107 (H) 65 - 100 mg/dL    BUN 15 6 - 20 MG/DL    Creatinine 0.73 0.55 - 1.02 MG/DL    BUN/Creatinine ratio 21 (H) 12 - 20      GFR est AA >60 >60 ml/min/1.73m2    GFR est non-AA >60 >60 ml/min/1.73m2    Calcium 9.0 8.5 - 10.1 MG/DL    Bilirubin, total 0.8 0.2 - 1.0 MG/DL    ALT (SGPT) 32 12 - 78 U/L    AST (SGOT) 37 15 - 37 U/L    Alk.  phosphatase 106 45 - 117 U/L    Protein, total 7.7 6.4 - 8.2 g/dL    Albumin 3.2 (L) 3.5 - 5.0 g/dL    Globulin 4.5 (H) 2.0 - 4.0 g/dL    A-G Ratio 0.7 (L) 1.1 - 2.2     PROTHROMBIN TIME + INR   Result Value Ref Range    INR 1.1 0.9 - 1.1      Prothrombin time 10.9 9.0 - 11.1 sec   TROPONIN I   Result Value Ref Range    Troponin-I, Qt. <0.05 <0.05 ng/mL   COVID-19 WITH INFLUENZA A/B   Result Value Ref Range    SARS-CoV-2 Detected (AA) NOTD      Influenza A by PCR Not detected NOTD      Influenza B by PCR Not detected NOTD     D DIMER   Result Value Ref Range    D-dimer 11.47 (H) 0.00 - 0.65 mg/L FEU   METABOLIC PANEL, BASIC   Result Value Ref Range    Sodium 137 136 - 145 mmol/L    Potassium 3.7 3.5 - 5.1 mmol/L    Chloride 103 97 - 108 mmol/L    CO2 25 21 - 32 mmol/L    Anion gap 9 5 - 15 mmol/L    Glucose 106 (H) 65 - 100 mg/dL    BUN 12 6 - 20 MG/DL    Creatinine 0.59 0.55 - 1.02 MG/DL    BUN/Creatinine ratio 20 12 - 20      GFR est AA >60 >60 ml/min/1.73m2    GFR est non-AA >60 >60 ml/min/1.73m2    Calcium 8.6 8.5 - 10.1 MG/DL   CBC WITH AUTOMATED DIFF   Result Value Ref Range    WBC 5.1 3.6 - 11.0 K/uL    RBC 4.50 3.80 - 5.20 M/uL    HGB 15.2 11.5 - 16.0 g/dL    HCT 41.6 35.0 - 47.0 %    MCV 92.4 80.0 - 99.0 FL    MCH 33.8 26.0 - 34.0 PG    MCHC 36.5 30.0 - 36.5 g/dL    RDW 12.4 11.5 - 14.5 %    PLATELET 399 896 - 787 K/uL    MPV 9.7 8.9 - 12.9 FL    NRBC 0.0 0  WBC    ABSOLUTE NRBC 0.00 0.00 - 0.01 K/uL    NEUTROPHILS 50 32 - 75 % LYMPHOCYTES 31 12 - 49 %    MONOCYTES 16 (H) 5 - 13 %    EOSINOPHILS 2 0 - 7 %    BASOPHILS 1 0 - 1 %    IMMATURE GRANULOCYTES 0 0.0 - 0.5 %    ABS. NEUTROPHILS 2.5 1.8 - 8.0 K/UL    ABS. LYMPHOCYTES 1.6 0.8 - 3.5 K/UL    ABS. MONOCYTES 0.8 0.0 - 1.0 K/UL    ABS. EOSINOPHILS 0.1 0.0 - 0.4 K/UL    ABS. BASOPHILS 0.1 0.0 - 0.1 K/UL    ABS. IMM. GRANS. 0.0 0.00 - 0.04 K/UL    DF AUTOMATED      RBC COMMENTS NORMOCYTIC, NORMOCHROMIC     METABOLIC PANEL, COMPREHENSIVE   Result Value Ref Range    Sodium 137 136 - 145 mmol/L    Potassium 3.8 3.5 - 5.1 mmol/L    Chloride 103 97 - 108 mmol/L    CO2 25 21 - 32 mmol/L    Anion gap 9 5 - 15 mmol/L    Glucose 103 (H) 65 - 100 mg/dL    BUN 13 6 - 20 MG/DL    Creatinine 0.59 0.55 - 1.02 MG/DL    BUN/Creatinine ratio 22 (H) 12 - 20      GFR est AA >60 >60 ml/min/1.73m2    GFR est non-AA >60 >60 ml/min/1.73m2    Calcium 8.6 8.5 - 10.1 MG/DL    Bilirubin, total 0.7 0.2 - 1.0 MG/DL    ALT (SGPT) 28 12 - 78 U/L    AST (SGOT) 34 15 - 37 U/L    Alk. phosphatase 95 45 - 117 U/L    Protein, total 7.1 6.4 - 8.2 g/dL    Albumin 2.8 (L) 3.5 - 5.0 g/dL    Globulin 4.3 (H) 2.0 - 4.0 g/dL    A-G Ratio 0.7 (L) 1.1 - 2.2     FERRITIN   Result Value Ref Range    Ferritin 1,303 (H) 8 - 252 NG/ML   SAMPLES BEING HELD   Result Value Ref Range    SAMPLES BEING HELD 1RED, 1BLUE     COMMENT        Add-on orders for these samples will be processed based on acceptable specimen integrity and analyte stability, which may vary by analyte.    LIPID PANEL   Result Value Ref Range    Cholesterol, total 203 (H) <200 MG/DL    Triglyceride 116 <150 MG/DL    HDL Cholesterol 36 MG/DL    LDL, calculated 143.8 (H) 0 - 100 MG/DL    VLDL, calculated 23.2 MG/DL    CHOL/HDL Ratio 5.6 (H) 0.0 - 5.0     METABOLIC PANEL, BASIC   Result Value Ref Range    Sodium 140 136 - 145 mmol/L    Potassium 4.3 3.5 - 5.1 mmol/L    Chloride 105 97 - 108 mmol/L    CO2 24 21 - 32 mmol/L    Anion gap 11 5 - 15 mmol/L    Glucose 164 (H) 65 - 100 mg/dL    BUN 10 6 - 20 MG/DL    Creatinine 0.58 0.55 - 1.02 MG/DL    BUN/Creatinine ratio 17 12 - 20      GFR est AA >60 >60 ml/min/1.73m2    GFR est non-AA >60 >60 ml/min/1.73m2    Calcium 8.9 8.5 - 10.1 MG/DL   LD   Result Value Ref Range     (H) 81 - 246 U/L   D DIMER   Result Value Ref Range    D-dimer 3.54 (H) 0.00 - 0.65 mg/L FEU   C REACTIVE PROTEIN, QT   Result Value Ref Range    C-Reactive protein 3.12 (H) 0.00 - 0.60 mg/dL   SAMPLES BEING HELD   Result Value Ref Range    SAMPLES BEING HELD 1SST, 1RED, 1LAV     COMMENT        Add-on orders for these samples will be processed based on acceptable specimen integrity and analyte stability, which may vary by analyte.    HEMOGLOBIN A1C WITH EAG   Result Value Ref Range    Hemoglobin A1c 5.9 (H) 4.0 - 5.6 %    Est. average glucose 123 mg/dL   C REACTIVE PROTEIN, QT   Result Value Ref Range    C-Reactive protein 0.92 (H) 0.00 - 0.60 mg/dL   GLUCOSE, POC   Result Value Ref Range    Glucose (POC) 109 65 - 117 mg/dL    Performed by Pamela Hadley RN    EKG, 12 LEAD, INITIAL   Result Value Ref Range    Ventricular Rate 74 BPM    Atrial Rate 74 BPM    P-R Interval 170 ms    QRS Duration 84 ms    Q-T Interval 392 ms    QTC Calculation (Bezet) 435 ms    Calculated P Axis 65 degrees    Calculated R Axis -12 degrees    Calculated T Axis 53 degrees    Diagnosis       Normal sinus rhythm  Normal ECG  No previous ECGs available  Confirmed by Steve Munoz MD, --- ([id number]) on 8/8/2021 10:53:04 PM     DUPLEX CAROTID BILATERAL   Result Value Ref Range    Right CCA prox sys 70.0 cm/s    Right CCA prox malone 12.0 cm/s    Right ICA prox sys 57.0 cm/s    Right ICA prox malone 14.0 cm/s    Left ICA prox sys 68.0 cm/s    Left ICA prox malone 19.0 cm/s    Right cca dist sys 66.3 centimeter/second    Right CCA dist malone 14.1 centimeter/second    Right vertebral sys 56.5 centimeter/second    RIGHT VERTEBRAL ARTERY D 8.66 centimeter/second    Right eca sys 70.2 centimeter/second    RIGHT EXTERNAL CAROTID ARTERY D 7.51 centimeter/second    Right ICA dist sys 62.0 centimeter/second    Right ICA dist malone 21.3 centimeter/second    Right ICA mid sys 63.5 centimeter/second    Right ICA mid malone 15.3 centimeter/second    Right ICA/CCA sys 0.9     Left CCA dist sys 55.4 centimeter/second    Left CCA dist malone 12.2 centimeter/second    Left CCA prox sys 65.9 centimeter/second    Left CCA prox malone 12.7 centimeter/second    Left vertebral sys 38.3 centimeter/second    LEFT VERTEBRAL ARTERY D 11.13 centimeter/second    Left vertebral sys 34.7 centimeter/second    LEFT VERTEBRAL ARTERY D 8.60 centimeter/second    Left ECA sys 62.6 centimeter/second    LEFT EXTERNAL CAROTID ARTERY D 6.78 centimeter/second    Left ICA dist sys 61.9 centimeter/second    Left ICA dist malone 22.4 centimeter/second    Left ICA mid sys 57.2 centimeter/second    Left ICA mid malone 18.0 centimeter/second    Left ICA/CCA sys 1.04    ECHO ADULT COMPLETE   Result Value Ref Range    IVSd 1.18 (A) 0.60 - 0.90 cm    LVIDd 4.28 3.90 - 5.30 cm    LVIDs 2.64 cm    LVOT d 1.98 cm    LVPWd 1.08 (A) 0.60 - 0.90 cm    TR Max Velocity 69.08 cm/s    Left Atrium Major Axis 3.17 cm    Est. RA Pressure 10.00 mmHg    AoV PG 5.27 mmHg    Aortic Valve Systolic Peak Velocity 231.73 cm/s    MV A Rufus 52.40 cm/s    Mitral Valve E Wave Deceleration Time 252.80 ms    MV E Rufus 53.23 cm/s    E/E' septal 6.13     LV E' Septal Velocity 8.68 cm/s    Ao Root D 3.43 cm    MV E/A 1.02     LV Mass .1 67.0 - 162.0 g    LV Mass AL Index 97.2 43.0 - 95.0 g/m2    Left Atrium Minor Axis 1.83 cm       No results found for this visit on 08/23/21. Verbal and written instructions (see AVS) provided. Patient expresses understanding of diagnosis and treatment plan. Follow-up and Dispositions    · Return in about 6 months (around 2/23/2022).          Ana Thomas NP

## 2021-08-23 ENCOUNTER — OFFICE VISIT (OUTPATIENT)
Dept: FAMILY MEDICINE CLINIC | Age: 71
End: 2021-08-23
Payer: MEDICARE

## 2021-08-23 VITALS
WEIGHT: 153.13 LBS | HEART RATE: 69 BPM | SYSTOLIC BLOOD PRESSURE: 118 MMHG | DIASTOLIC BLOOD PRESSURE: 67 MMHG | BODY MASS INDEX: 25.51 KG/M2 | OXYGEN SATURATION: 98 % | RESPIRATION RATE: 18 BRPM | HEIGHT: 65 IN | TEMPERATURE: 96.8 F

## 2021-08-23 DIAGNOSIS — Z86.73 HISTORY OF CVA (CEREBROVASCULAR ACCIDENT): Primary | ICD-10-CM

## 2021-08-23 DIAGNOSIS — E78.00 PURE HYPERCHOLESTEROLEMIA: ICD-10-CM

## 2021-08-23 DIAGNOSIS — E83.118 OTHER HEMOCHROMATOSIS: ICD-10-CM

## 2021-08-23 DIAGNOSIS — I10 ESSENTIAL HYPERTENSION: ICD-10-CM

## 2021-08-23 PROCEDURE — 99204 OFFICE O/P NEW MOD 45 MIN: CPT | Performed by: NURSE PRACTITIONER

## 2021-08-23 NOTE — PROGRESS NOTES
1. Have you been to the ER, urgent care clinic since your last visit? Hospitalized since your last visit? [facility] for ER Stroke    2. Have you seen or consulted any other health care providers outside of the [address] [place] [address] since your last visit? Include any pap smears or colon screening.  No     Chief Complaint   Patient presents with   [facility] Follow Up    Neurologic Problem     Visit Vitals  /67 (BP 1 Location: Left arm, BP Patient Position: Sitting)   Pulse 69   Temp 96.8 °F (36 °C) (Temporal)   Resp 18   Ht 5' 5\" (1.651 m)   Wt 153 lb 2 oz (69.5 kg)   SpO2 98%   BMI 25.48 kg/m²

## 2021-08-24 NOTE — ED TRIAGE NOTES
Pt reports weakness about an hour PTA lasting 20 mins. Assumed COVID positive because house members are.  Awake , alert and oriented x 4 [Dear  ___] : Dear  [unfilled], [I had the pleasure of evaluating your patient, [unfilled]. Thank you for referring this patient for consultation for ___] : I had the pleasure of evaluating your patient, [unfilled]. Thank you for referring this patient for consultation for [unfilled]. [Attached please find my note.] : Attached please find my note. [Thank you very much for allowing me to participate in the care of this patient. If you have any questions, please do not hesitate to contact me] : Thank you very much for allowing me to participate in the care of this patient. If you have any questions, please do not hesitate to contact me. 282; 1st degree AVB

## 2021-09-01 ENCOUNTER — TELEPHONE (OUTPATIENT)
Dept: FAMILY MEDICINE CLINIC | Age: 71
End: 2021-09-01

## 2021-09-01 RX ORDER — ATORVASTATIN CALCIUM 40 MG/1
40 TABLET, FILM COATED ORAL
Qty: 90 TABLET | Refills: 2 | Status: SHIPPED | OUTPATIENT
Start: 2021-09-01 | End: 2022-03-15 | Stop reason: SDUPTHER

## 2021-09-01 RX ORDER — AMLODIPINE BESYLATE 5 MG/1
5 TABLET ORAL DAILY
Qty: 90 TABLET | Refills: 2 | Status: SHIPPED | OUTPATIENT
Start: 2021-09-01 | End: 2022-09-12

## 2021-09-07 ENCOUNTER — DOCUMENTATION ONLY (OUTPATIENT)
Dept: FAMILY MEDICINE CLINIC | Age: 71
End: 2021-09-07

## 2021-09-07 NOTE — PROGRESS NOTES
Spoke with patient over the phone. Referral placed to Dr. Amber Banegas for hemochromatosis, pt will make her own appt. Pt informed she should have a TSH level checked; she would like to do this with Dr. Amber Banegas, but if she is not able to have him draw this blood test then she will make an appt at our office to have her TSH level drawn.    Pending TSH test placed

## 2021-09-27 ENCOUNTER — HOSPITAL ENCOUNTER (EMERGENCY)
Age: 71
Discharge: HOME | End: 2021-09-27
Attending: FAMILY MEDICINE
Payer: MEDICARE

## 2021-09-27 ENCOUNTER — APPOINTMENT (OUTPATIENT)
Dept: GENERAL RADIOLOGY | Age: 71
End: 2021-09-27
Attending: FAMILY MEDICINE
Payer: MEDICARE

## 2021-09-27 VITALS
WEIGHT: 155 LBS | SYSTOLIC BLOOD PRESSURE: 127 MMHG | RESPIRATION RATE: 16 BRPM | HEART RATE: 72 BPM | OXYGEN SATURATION: 100 % | BODY MASS INDEX: 25.83 KG/M2 | TEMPERATURE: 98.3 F | HEIGHT: 65 IN | DIASTOLIC BLOOD PRESSURE: 72 MMHG

## 2021-09-27 DIAGNOSIS — S83.91XA SPRAIN OF RIGHT KNEE, UNSPECIFIED LIGAMENT, INITIAL ENCOUNTER: Primary | ICD-10-CM

## 2021-09-27 PROCEDURE — 73562 X-RAY EXAM OF KNEE 3: CPT

## 2021-09-27 PROCEDURE — 99284 EMERGENCY DEPT VISIT MOD MDM: CPT

## 2021-09-28 NOTE — ED PROVIDER NOTES
HPI  At approximately 3:30, 5 hours ago, the patient twisted her right knee in the process of getting her sister's dog out of the car. She did not fall, but she had a fairly severe twist sideways that caused pain. She took ibuprofen and iced the knee, and then she had increased pain and stiffness. At the point when she called EMS, she was unable to bear weight on the RLE because of the pain. There was some swelling as well. She has never injured this knee before. Past Medical History:   Diagnosis Date    Hemochromatosis     HTN (hypertension)     [facility]roke [facility] [address]        No past surgical history on file. Family History:   Problem Relation Age of Onset    Diabetes Mother     Diabetes Sister     Cancer Brother     Lung Cancer Brother        Social History     Socioeconomic History    Marital status:      Spouse name: Not on file    Number of children: Not on file    Years of education: Not on file    Highest education level: Not on file   Occupational History    Not on file   Tobacco Use    Smoking status: Former Smoker     Packs/day: 1.00     Years: 50.00     Pack years: 50.00     Quit date: 2021     Years since quittin.1    Smokeless tobacco: Never Used   Substance and Sexual Activity    Alcohol use: Yes     Alcohol/week: 3.0 - 4.0 standard drinks     Types: 3 - 4 Glasses of wine per week    Drug use: Never    Sexual activity: Not on file   Other Topics Concern    Not on file   Social History Narrative    Not on file     Social Determinants of Health     Financial Resource Strain:     Difficulty of Paying Living Expenses:    Food Insecurity:     Worried About Running Out of Food in the Last Year:     [address] in the Last Year:    Transportation Needs:     Lack of Transportation (Medical):      Lack of Transportation (Non-Medical):    Physical Activity:     Days of Exercise per Week:     Minutes of Exercise per Session:    Stress:     Feeling of Stress : Social Connections:     Frequency of Communication with Friends and Family:     Frequency of Social Gatherings with Friends and Family:     Attends Voodoo Services:     Active Member of Clubs or Organizations:     Attends Club or Organization Meetings:     Marital Status:    Intimate Partner Violence:     Fear of Current or Ex-Partner:     Emotionally Abused:     Physically Abused:     Sexually Abused: ALLERGIES: Lisinopril    Review of Systems   Cardiovascular: Positive for leg swelling. Musculoskeletal: Positive for gait problem and joint swelling. Negative for back pain and neck stiffness. There were no vitals filed for this visit. Physical Exam  Constitutional:       Appearance: Normal appearance. HENT:      Head: Normocephalic. Right Ear: External ear normal.      Left Ear: External ear normal.      Nose: Nose normal.      Mouth/Throat:      Mouth: Mucous membranes are moist.   Eyes:      Extraocular Movements: Extraocular movements intact. Pupils: Pupils are equal, round, and reactive to light. Cardiovascular:      Rate and Rhythm: Normal rate. Pulmonary:      Effort: Pulmonary effort is normal.   Musculoskeletal:      Cervical back: Normal range of motion and neck supple. Right knee: No swelling, deformity, effusion, erythema, ecchymosis, lacerations or bony tenderness. Decreased range of motion. Tenderness present. Normal alignment, normal meniscus and normal patellar mobility. Normal pulse. Legs:       Comments: Tender to palpation just superior and medial to patella. Minimal swelling. Skin:     General: Skin is warm. Neurological:      Mental Status: She is alert. Medical decision making: The history of the mechanism of injury and the examination of the knee are both fairly benign. There is minimal swelling of the knee, and the tenderness seems to be focused at the distal quadriceps muscle/tendon.   Will have the patient use ice and a knee immobilizer until she can see orthopedics. She does have access to a walker at home. XR KNEE RT 3 V    Result Date: 9/27/2021  Mild degenerative changes patellofemoral compartment. Imp: Right knee sprain      Plan: Discharge home           Ice/Rest/ Ibuprofen           Weight bearing as tolerated           F/U ortho this week or next.       Sarai Yip MD

## 2021-09-28 NOTE — ED NOTES
I have reviewed discharge instructions with the patient. The patient verbalized understanding.      Pt did not want to call family at this time, to lobby to wait in [facility] until ride called in AM.

## 2021-09-28 NOTE — ED TRIAGE NOTES
Pt fell at 1530 was ok initially then increasing pain rt knee with movement and wt Bearing.   Slight swelling Medial and anterior knee

## 2022-03-15 ENCOUNTER — OFFICE VISIT (OUTPATIENT)
Dept: FAMILY MEDICINE CLINIC | Age: 72
End: 2022-03-15
Payer: MEDICARE

## 2022-03-15 VITALS
WEIGHT: 161.25 LBS | OXYGEN SATURATION: 96 % | BODY MASS INDEX: 26.87 KG/M2 | DIASTOLIC BLOOD PRESSURE: 65 MMHG | HEIGHT: 65 IN | HEART RATE: 87 BPM | SYSTOLIC BLOOD PRESSURE: 113 MMHG | RESPIRATION RATE: 18 BRPM | TEMPERATURE: 96 F

## 2022-03-15 DIAGNOSIS — E78.00 PURE HYPERCHOLESTEROLEMIA: ICD-10-CM

## 2022-03-15 DIAGNOSIS — F32.0 CURRENT MILD EPISODE OF MAJOR DEPRESSIVE DISORDER, UNSPECIFIED WHETHER RECURRENT (HCC): ICD-10-CM

## 2022-03-15 DIAGNOSIS — I10 ESSENTIAL HYPERTENSION: ICD-10-CM

## 2022-03-15 DIAGNOSIS — E83.118 OTHER HEMOCHROMATOSIS: Primary | ICD-10-CM

## 2022-03-15 PROCEDURE — 99214 OFFICE O/P EST MOD 30 MIN: CPT | Performed by: NURSE PRACTITIONER

## 2022-03-15 RX ORDER — ATORVASTATIN CALCIUM 40 MG/1
40 TABLET, FILM COATED ORAL
Qty: 90 TABLET | Refills: 3 | Status: SHIPPED | OUTPATIENT
Start: 2022-03-15

## 2022-03-15 RX ORDER — SERTRALINE HYDROCHLORIDE 25 MG/1
25 TABLET, FILM COATED ORAL DAILY
Qty: 90 TABLET | Refills: 2 | Status: SHIPPED | OUTPATIENT
Start: 2022-03-15

## 2022-03-15 NOTE — PROGRESS NOTES
1. \"Have you been to the ER, urgent care clinic since your last visit? Hospitalized since your last visit? \" No    2. \"Have you seen or consulted any other health care providers outside of the [address] [place] [address] since your last visit? \" No     3. For patients aged 39-70: Has the patient had a colonoscopy / FIT/ Cologuard? Yes - Care Gap present. Most recent result on file      If the patient is female:    4. For patients aged 41-77: Has the patient had a mammogram within the past 2 years? No      5. For patients aged 21-65: Has the patient had a pap smear? NA - based on age or sex      Chief Complaint   Patient presents with    Depression     Visit Vitals  /65 (BP 1 Location: Left arm, BP Patient Position: Sitting)   Pulse 87   Temp (!) 96 °F (35.6 °C) (Temporal)   Resp 18   Ht 5' 5\" (1.651 m)   Wt 161 lb 4 oz (73.1 kg)   SpO2 96%   BMI 26.83 kg/m²     Labs drawn in Left arm per Doroteo Latif NP's orders. Patient tolerated well.

## 2022-03-15 NOTE — PROGRESS NOTES
Micki Saini is a 73 y.o. female who presents today   with c/o   Chief Complaint   Patient presents with    Depression    Cholesterol Problem         Assessment/ Plan:     HTN:  Check labs  Cont Amlodipine 5mg daily  Low-sodium diet  Exercise  RTO or go to ER for any CP, SOB, dizziness, or swelling. 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 Denies CP/SOB. HLD  She was previously taking pravastatin and was started on atorvastatin 40mg during hospitalization. Lab Results   Component Value Date/Time    Cholesterol, total 203 (H) 08/09/2021 06:44 AM    HDL Cholesterol 36 08/09/2021 06:44 AM    LDL, calculated 143.8 (H) 08/09/2021 06:44 AM    VLDL, calculated 23.2 08/09/2021 06:44 AM    Triglyceride 116 08/09/2021 06:44 AM    CHOL/HDL Ratio 5.6 (H) 08/09/2021 06:44 AM       Hemachromatosis:  History of hemachromatosis. Reports she was diagnosed about 6 years ago. States she had two phlebotomy treatments in the past, but has not needed any treatments in over 6 years. Upon chart review, patients ferritin was checked 8/2021 and was 1303. Will recheck labs today. She was previously referred to Dr. Dyan Almeida but never made an appointment. She is requesting our office to order the phlebotomy treatment. Depression  Reports history of depression. Took zoloft in the past. Feels anxious around people and would like to start taking zoloft again. Denies SI/HI.     Health Maintenance:  Hepatitis C Screening: declined at this time  DTAP: declined   Colonoscopy:  declined  Shingrix: declined  Mammogram: declined  Dexa: declined  PNA Vaccine: declined    Patient Active Problem List   Diagnosis Code    TIA (transient ischemic attack) G45.9    COVID-19 U07.1    Cerebral infarction, left hemisphere ([facility] [address] I63.9    Essential hypertension I10    Hyperlipidemia E78.5    Hemochromatosis E83.119       Past Medical History:   Diagnosis Date    Hemochromatosis     HTN (hypertension)     Stroke ([facility] [address]          Current Outpatient Medications:     atorvastatin (LIPITOR) 40 mg tablet, Take 1 Tablet by mouth nightly., Disp: 90 Tablet, Rfl: 3    sertraline (ZOLOFT) 25 mg tablet, Take 1 Tablet by mouth daily. , Disp: 90 Tablet, Rfl: 2    amLODIPine (NORVASC) 5 mg tablet, Take 1 Tablet by mouth daily. , Disp: 90 Tablet, Rfl: 2    aspirin 81 mg chewable tablet, Take 1 Tablet by mouth daily. , Disp: , Rfl:     Allergies   Allergen Reactions    Lisinopril Cough     Note:  Allergy information obtained from PCP's office       History reviewed. No pertinent surgical history. Social History     Tobacco Use   Smoking Status Former Smoker    Packs/day: 1.00    Years: 50.00    Pack years: 50.00    Quit date: 2021    Years since quittin.6   Smokeless Tobacco Never Used       Social History     Socioeconomic History    Marital status:    Tobacco Use    Smoking status: Former Smoker     Packs/day: 1.00     Years: 50.00     Pack years: 50.00     Quit date: 2021     Years since quittin.6    Smokeless tobacco: Never Used   Substance and Sexual Activity    Alcohol use: Yes     Alcohol/week: 3.0 - 4.0 standard drinks     Types: 3 - 4 Glasses of wine per week    Drug use: Never       Family History   Problem Relation Age of Onset    Diabetes Mother     Diabetes Sister     Cancer Brother     Lung Cancer Brother        ROS:  Review of Systems   Constitutional: Negative for chills, fever and weight loss. HENT: Negative for ear discharge, ear pain and sinus pain. Eyes: Negative for blurred vision, double vision, pain and discharge. Respiratory: Negative for cough, hemoptysis, shortness of breath and wheezing. Cardiovascular: Negative for chest pain and palpitations. Gastrointestinal: Negative for abdominal pain, diarrhea, nausea and vomiting. Genitourinary: Negative for dysuria, flank pain, frequency and hematuria. Musculoskeletal: Negative for back pain, falls, joint pain and myalgias. Skin: Negative for rash. Neurological: Negative for dizziness, speech change, loss of consciousness, weakness and headaches. Endo/Heme/Allergies: Does not bruise/bleed easily. Psychiatric/Behavioral: Positive for depression. Negative for hallucinations and suicidal ideas. The patient is nervous/anxious. Objective:     Visit Vitals  /65 (BP 1 Location: Left arm, BP Patient Position: Sitting)   Pulse 87   Temp (!) 96 °F (35.6 °C) (Temporal)   Resp 18   Ht 5' 5\" (1.651 m)   Wt 161 lb 4 oz (73.1 kg)   SpO2 96%   BMI 26.83 kg/m²     Body mass index is 26.83 kg/m². Physical Exam  Vitals reviewed. Constitutional:       General: She is awake. Appearance: Normal appearance. She is well-developed, well-groomed and normal weight. She is not ill-appearing. HENT:      Head: Normocephalic and atraumatic. Nose: Nose normal.      Mouth/Throat:      Pharynx: Oropharynx is clear. Eyes:      Extraocular Movements: Extraocular movements intact. Conjunctiva/sclera: Conjunctivae normal.      Pupils: Pupils are equal, round, and reactive to light. Cardiovascular:      Rate and Rhythm: Normal rate and regular rhythm. Pulses: Normal pulses. Heart sounds: Normal heart sounds. No murmur heard. No friction rub. Pulmonary:      Effort: Pulmonary effort is normal.      Breath sounds: Normal breath sounds. No wheezing. Abdominal:      General: Bowel sounds are normal.      Palpations: Abdomen is soft. Tenderness: There is no abdominal tenderness. There is no guarding.    Musculoskeletal:         General: Normal range of motion. Cervical back: Normal range of motion. Skin:     General: Skin is warm and dry. Capillary Refill: Capillary refill takes less than 2 seconds. Neurological:      General: No focal deficit present. Mental Status: She is alert and oriented to person, place, and time. Mental status is at baseline. Psychiatric:         Mood and Affect: Mood normal.         Behavior: Behavior normal. Behavior is cooperative. Thought Content: Thought content normal.         Judgment: Judgment normal.         No results found for this visit on 03/15/22. Verbal and written instructions (see AVS) provided. Patient expresses understanding of diagnosis and treatment plan. Follow-up and Dispositions    · Return in about 1 month (around 4/15/2022).          Maryuri Gay NP

## 2022-03-16 LAB
ALBUMIN SERPL-MCNC: 4.4 G/DL (ref 3.5–5)
ALBUMIN/GLOB SERPL: 1.4 {RATIO} (ref 1.1–2.2)
ALP SERPL-CCNC: 122 U/L (ref 45–117)
ALT SERPL-CCNC: 26 U/L (ref 12–78)
ANION GAP SERPL CALC-SCNC: 3 MMOL/L (ref 5–15)
AST SERPL-CCNC: 16 U/L (ref 15–37)
BASOPHILS # BLD: 0.1 K/UL (ref 0–0.1)
BASOPHILS NFR BLD: 1 % (ref 0–1)
BILIRUB SERPL-MCNC: 0.5 MG/DL (ref 0.2–1)
BUN SERPL-MCNC: 14 MG/DL (ref 6–20)
BUN/CREAT SERPL: 19 (ref 12–20)
CALCIUM SERPL-MCNC: 9.4 MG/DL (ref 8.5–10.1)
CHLORIDE SERPL-SCNC: 107 MMOL/L (ref 97–108)
CO2 SERPL-SCNC: 28 MMOL/L (ref 21–32)
CREAT SERPL-MCNC: 0.73 MG/DL (ref 0.55–1.02)
DIFFERENTIAL METHOD BLD: ABNORMAL
EOSINOPHIL # BLD: 0 K/UL (ref 0–0.4)
EOSINOPHIL NFR BLD: 0 % (ref 0–7)
ERYTHROCYTE [DISTWIDTH] IN BLOOD BY AUTOMATED COUNT: 13.5 % (ref 11.5–14.5)
FERRITIN SERPL-MCNC: 394 NG/ML (ref 26–388)
GLOBULIN SER CALC-MCNC: 3.2 G/DL (ref 2–4)
GLUCOSE SERPL-MCNC: 95 MG/DL (ref 65–100)
HCT VFR BLD AUTO: 42.3 % (ref 35–47)
HGB BLD-MCNC: 14.2 G/DL (ref 11.5–16)
IMM GRANULOCYTES # BLD AUTO: 0 K/UL (ref 0–0.04)
IMM GRANULOCYTES NFR BLD AUTO: 0 % (ref 0–0.5)
LYMPHOCYTES # BLD: 2.3 K/UL (ref 0.8–3.5)
LYMPHOCYTES NFR BLD: 26 % (ref 12–49)
MCH RBC QN AUTO: 33.4 PG (ref 26–34)
MCHC RBC AUTO-ENTMCNC: 33.6 G/DL (ref 30–36.5)
MCV RBC AUTO: 99.5 FL (ref 80–99)
MONOCYTES # BLD: 0.8 K/UL (ref 0–1)
MONOCYTES NFR BLD: 9 % (ref 5–13)
NEUTS SEG # BLD: 5.8 K/UL (ref 1.8–8)
NEUTS SEG NFR BLD: 64 % (ref 32–75)
NRBC # BLD: 0 K/UL (ref 0–0.01)
NRBC BLD-RTO: 0 PER 100 WBC
PLATELET # BLD AUTO: 301 K/UL (ref 150–400)
PMV BLD AUTO: 9.6 FL (ref 8.9–12.9)
POTASSIUM SERPL-SCNC: 4.4 MMOL/L (ref 3.5–5.1)
PROT SERPL-MCNC: 7.6 G/DL (ref 6.4–8.2)
RBC # BLD AUTO: 4.25 M/UL (ref 3.8–5.2)
SODIUM SERPL-SCNC: 138 MMOL/L (ref 136–145)
TSH SERPL DL<=0.05 MIU/L-ACNC: 0.95 UIU/ML (ref 0.36–3.74)
WBC # BLD AUTO: 9 K/UL (ref 3.6–11)

## 2022-03-18 PROBLEM — U07.1 COVID-19: Status: ACTIVE | Noted: 2021-08-08

## 2022-03-19 PROBLEM — E78.5 HYPERLIPIDEMIA: Status: ACTIVE | Noted: 2021-08-12

## 2022-03-19 PROBLEM — I10 ESSENTIAL HYPERTENSION: Status: ACTIVE | Noted: 2021-08-12

## 2022-03-19 PROBLEM — G45.9 TIA (TRANSIENT ISCHEMIC ATTACK): Status: ACTIVE | Noted: 2021-08-08

## 2022-03-20 PROBLEM — I63.9 CEREBRAL INFARCTION, LEFT HEMISPHERE (HCC): Status: ACTIVE | Noted: 2021-08-10

## 2022-03-24 PROBLEM — F32.0 CURRENT MILD EPISODE OF MAJOR DEPRESSIVE DISORDER (HCC): Status: ACTIVE | Noted: 2022-03-15

## 2022-04-13 NOTE — PROGRESS NOTES
Consent:  Shonda Wylie was evaluated through a synchronous (real-time) audio  encounter. The patient (or guardian if applicable) is aware that this is a billable service, which includes applicable co-pays. This Virtual Visit was conducted with patients (and or legal guardians) consent. The visit was conducted pursuant to the emergency declaration under the [facility], [id number] authority and the coronavirus preparedness and [employer] Act. Patient identification was verified, and a care giver was present when appropriate. The patient was located in a state where the provider was licensed to provide care. I was in the office while conducting this encounter. Shonda Wylie is a 73 y.o. female who was seen by synchronous (real-time) audio technology on 4/14/2022. Pt was seen at home. No other participants in this encounter. Subjective:   Depression  Patient started on sertraline one month ago. She reports she has not noticed much of a difference, but she would like to give it a little more time. Has taken sertraline in the past.     Offered to increase the dose today, but she would like to wait and increase the dose on her next visit if needed. Hemachromatosis  I faxed over an order for phlebotomy treatment on her previous OV since her ferritin level was 1,303 on 8/9/21. Her ferritin was rechecked on 3/15/22 and it was 394. She decided on her own not to do the phlebotomy treatment. I advised her to make an office visit to be seen in the office in 3 months so we can recheck the ferritin again. PMH, SH, Medications/Allergies: reviewed, on chart. Current Outpatient Medications   Medication Sig    atorvastatin (LIPITOR) 40 mg tablet Take 1 Tablet by mouth nightly.  sertraline (ZOLOFT) 25 mg tablet Take 1 Tablet by mouth daily.  amLODIPine (NORVASC) 5 mg tablet Take 1 Tablet by mouth daily.     aspirin 81 mg chewable tablet Take 1 Tablet by mouth daily. No current facility-administered medications for this visit. Allergies   Allergen Reactions    Lisinopril Cough     Note:  Allergy information obtained from PCP's office       ROS:  Gen: denies fever, chills, or fatigue  HEENT:denies H/A or vision changes  Resp: denies dyspnea, cough, or wheezing  CV: denies chest pain, pressure, or palpitations  Extremeties: denies edema  Musculoskeletal: no joint pain, stiffness, or muscle cramps  Neuro: denies numbness/tingling or dizziness  Skin: denies rashes or new lesions     VS review: Wt Readings from Last 3 Encounters:   03/15/22 161 lb 4 oz (73.1 kg)   09/27/21 155 lb (70.3 kg)   08/23/21 153 lb 2 oz (69.5 kg)     BP Readings from Last 3 Encounters:   03/15/22 113/65   09/27/21 127/72   08/23/21 118/67       Objective:     General: alert, cooperative, no distress   Mental  status: mental status: alert, oriented to person, place, and time, normal mood, behavior, speech, dress, motor activity, and thought processes                   Assessment & Plan:   Depression  Cont sertraline 25mg  Will increase sertraline dose on her 3 month follow up if needed  F/U: 3 months    Hemochromatosis  Ferritin level on 3/15/22 was 394  Will recheck Ferritin in 3 months    Time-based coding, delete if not needed: I spent at least 10 minutes with this established patient, and >50% of the time was spent counseling and/or coordinating care regarding see above  Toñito Cedillo NP      Due to this being a TeleHealth evaluation, many elements of the physical examination are unable to be assessed. We discussed the expected course, resolution and complications of the diagnosis(es) in detail. Medication risks, benefits, costs, interactions, and alternatives were discussed as indicated. I advised her to contact the office if her condition worsens, changes or fails to improve as anticipated. She expressed understanding with the diagnosis(es) and plan.      Pursuant to the emergency declaration under the [facility] waiver authority and the [employer] and [employer] Act, this Virtual  Visit was conducted, with patient's consent, to reduce the patient's risk of exposure to COVID-19 and provide continuity of care for an established patient. Services were provided through an audio synchronous discussion virtually to substitute for in-person clinic visit.     CPT Codes 78424-24969 for Established Patients may apply to this Telehealth Visit

## 2022-04-14 ENCOUNTER — VIRTUAL VISIT (OUTPATIENT)
Dept: FAMILY MEDICINE CLINIC | Age: 72
End: 2022-04-14
Payer: MEDICARE

## 2022-04-14 DIAGNOSIS — E83.118 OTHER HEMOCHROMATOSIS: ICD-10-CM

## 2022-04-14 DIAGNOSIS — F32.0 CURRENT MILD EPISODE OF MAJOR DEPRESSIVE DISORDER, UNSPECIFIED WHETHER RECURRENT (HCC): Primary | ICD-10-CM

## 2022-04-14 PROCEDURE — G2025 DIS SITE TELE SVCS RHC/FQHC: HCPCS | Performed by: NURSE PRACTITIONER

## 2022-04-14 NOTE — PROGRESS NOTES
1. \"Have you been to the ER, urgent care clinic since your last visit? Hospitalized since your last visit? \" No    2. \"Have you seen or consulted any other health care providers outside of the [address] [place] [address] since your last visit? \" No     Chief Complaint   Patient presents with    Depression

## 2022-09-12 RX ORDER — AMLODIPINE BESYLATE 5 MG/1
TABLET ORAL
Qty: 90 TABLET | Refills: 2 | Status: SHIPPED | OUTPATIENT
Start: 2022-09-12

## 2022-12-17 RX ORDER — SERTRALINE HYDROCHLORIDE 25 MG/1
TABLET, FILM COATED ORAL
Qty: 90 TABLET | Refills: 2 | Status: SHIPPED | OUTPATIENT
Start: 2022-12-17

## 2023-05-17 ENCOUNTER — TELEPHONE (OUTPATIENT)
Age: 73
End: 2023-05-17

## 2023-05-17 RX ORDER — ATORVASTATIN CALCIUM 40 MG/1
40 TABLET, FILM COATED ORAL NIGHTLY
Qty: 30 TABLET | Refills: 0 | Status: SHIPPED | OUTPATIENT
Start: 2023-05-17

## 2023-05-31 RX ORDER — ATORVASTATIN CALCIUM 40 MG/1
40 TABLET, FILM COATED ORAL NIGHTLY
Qty: 90 TABLET | Refills: 3 | OUTPATIENT
Start: 2023-05-31

## 2023-06-12 RX ORDER — ATORVASTATIN CALCIUM 40 MG/1
40 TABLET, FILM COATED ORAL NIGHTLY
Qty: 30 TABLET | Refills: 0 | OUTPATIENT
Start: 2023-06-12

## 2023-06-12 NOTE — TELEPHONE ENCOUNTER
Office Visit on 03/15/2022   Component Date Value Ref Range Status    Sodium 03/15/2022 138  136 - 145 mmol/L Final    Potassium 03/15/2022 4.4  3.5 - 5.1 mmol/L Final    Chloride 03/15/2022 107  97 - 108 mmol/L Final    CO2 03/15/2022 28  21 - 32 mmol/L Final    Anion Gap 03/15/2022 3 (L)  5 - 15 mmol/L Final    Glucose 03/15/2022 95  65 - 100 mg/dL Final    BUN 03/15/2022 14  6 - 20 MG/DL Final    Creatinine 03/15/2022 0.73  0.55 - 1.02 MG/DL Final    Bun/Cre Ratio 03/15/2022 19  12 - 20   Final    GFR  03/15/2022 >60  >60 ml/min/1.73m2 Final    EGFR IF NonAfrican American 03/15/2022 >60  >60 ml/min/1.73m2 Final    Comment: Estimated GFR is calculated using the IDMS-traceable Modification of Diet in  Renal Disease (MDRD) Study equation, reported for both  Americans  (GFRAA) and non- Americans (GFRNA), and normalized to 1.73m2 body  surface area. The physician must decide which value applies to the patient. Calcium 03/15/2022 9.4  8.5 - 10.1 MG/DL Final    Total Bilirubin 03/15/2022 0.5  0.2 - 1.0 MG/DL Final    ALT 03/15/2022 26  12 - 78 U/L Final    AST 03/15/2022 16  15 - 37 U/L Final    Alkaline Phosphatase 03/15/2022 122 (H)  45 - 117 U/L Final    Total Protein 03/15/2022 7.6  6.4 - 8.2 g/dL Final    Albumin 03/15/2022 4.4  3.5 - 5.0 g/dL Final    Globulin 03/15/2022 3.2  2.0 - 4.0 g/dL Final    Albumin/Globulin Ratio 03/15/2022 1.4  1.1 - 2.2   Final    TSH 03/15/2022 0.95  0.36 - 3.74 uIU/mL Final    Comment:   Due to TSH heterogeneity, both structurally and degree of glycosylation,  monoclonal antibodies used in the TSH assay may not accurately quantitate TSH. Therefore, this result should be correlated with clinical findings as well as  with other assessments of thyroid function, e.g., free T4, free T3.       WBC 03/15/2022 9.0  3.6 - 11.0 K/uL Final    RBC 03/15/2022 4.25  3.80 - 5.20 M/uL Final    Hemoglobin 03/15/2022 14.2  11.5 - 16.0 g/dL Final    Hematocrit

## 2024-03-11 DIAGNOSIS — F32.0 MAJOR DEPRESSIVE DISORDER, SINGLE EPISODE, MILD (HCC): ICD-10-CM

## 2024-03-11 RX ORDER — AMLODIPINE BESYLATE 5 MG/1
5 TABLET ORAL DAILY
Qty: 90 TABLET | Refills: 1 | OUTPATIENT
Start: 2024-03-11

## 2024-03-18 DIAGNOSIS — E78.00 HYPERCHOLESTEROLEMIA: ICD-10-CM

## 2024-03-18 RX ORDER — ATORVASTATIN CALCIUM 40 MG/1
40 TABLET, FILM COATED ORAL NIGHTLY
Qty: 90 TABLET | Refills: 0 | Status: SHIPPED | OUTPATIENT
Start: 2024-03-18

## 2024-03-18 NOTE — TELEPHONE ENCOUNTER
Patient requesting refill on     Requested Prescriptions     Pending Prescriptions Disp Refills    atorvastatin (LIPITOR) 40 MG tablet [Pharmacy Med Name: ATORVASTATIN 40 MG TABLET] 90 tablet 0     Sig: TAKE 1 TABLET BY MOUTH EVERY DAY AT NIGHT        6/12/2023

## 2024-04-08 RX ORDER — AMLODIPINE BESYLATE 5 MG/1
5 TABLET ORAL DAILY
Qty: 90 TABLET | Refills: 1 | OUTPATIENT
Start: 2024-04-08

## 2024-04-15 RX ORDER — AMLODIPINE BESYLATE 5 MG/1
5 TABLET ORAL DAILY
Qty: 90 TABLET | Refills: 1 | Status: SHIPPED | OUTPATIENT
Start: 2024-04-15

## 2024-04-15 NOTE — TELEPHONE ENCOUNTER
Medication Refill Request    Marina Fletcher is requesting a refill of the following medication(s):     Amlodipine 5 MG Tab    Please send refill to:     [id number] [place] [place] [facility] [address] [facility] [phone] - F [phone]  [address] [facility] [address]  Phone: [phone] Fax: [phone]

## 2024-05-07 ENCOUNTER — OFFICE VISIT (OUTPATIENT)
Age: 74
End: 2024-05-07
Payer: MEDICARE

## 2024-05-07 VITALS
OXYGEN SATURATION: 98 % | TEMPERATURE: 97 F | BODY MASS INDEX: 25.66 KG/M2 | SYSTOLIC BLOOD PRESSURE: 136 MMHG | WEIGHT: 154 LBS | HEART RATE: 70 BPM | HEIGHT: 65 IN | RESPIRATION RATE: 18 BRPM | DIASTOLIC BLOOD PRESSURE: 78 MMHG

## 2024-05-07 DIAGNOSIS — F32.0 MAJOR DEPRESSIVE DISORDER, SINGLE EPISODE, MILD (HCC): ICD-10-CM

## 2024-05-07 DIAGNOSIS — E83.118 OTHER HEMOCHROMATOSIS: ICD-10-CM

## 2024-05-07 DIAGNOSIS — I10 ESSENTIAL (PRIMARY) HYPERTENSION: ICD-10-CM

## 2024-05-07 DIAGNOSIS — E78.00 HYPERCHOLESTEROLEMIA: Primary | ICD-10-CM

## 2024-05-07 PROCEDURE — 3075F SYST BP GE 130 - 139MM HG: CPT | Performed by: NURSE PRACTITIONER

## 2024-05-07 PROCEDURE — 1123F ACP DISCUSS/DSCN MKR DOCD: CPT | Performed by: NURSE PRACTITIONER

## 2024-05-07 PROCEDURE — G8427 DOCREV CUR MEDS BY ELIG CLIN: HCPCS | Performed by: NURSE PRACTITIONER

## 2024-05-07 PROCEDURE — 3078F DIAST BP <80 MM HG: CPT | Performed by: NURSE PRACTITIONER

## 2024-05-07 PROCEDURE — G8400 PT W/DXA NO RESULTS DOC: HCPCS | Performed by: NURSE PRACTITIONER

## 2024-05-07 PROCEDURE — 4004F PT TOBACCO SCREEN RCVD TLK: CPT | Performed by: NURSE PRACTITIONER

## 2024-05-07 PROCEDURE — 3017F COLORECTAL CA SCREEN DOC REV: CPT | Performed by: NURSE PRACTITIONER

## 2024-05-07 PROCEDURE — 1090F PRES/ABSN URINE INCON ASSESS: CPT | Performed by: NURSE PRACTITIONER

## 2024-05-07 PROCEDURE — 99214 OFFICE O/P EST MOD 30 MIN: CPT | Performed by: NURSE PRACTITIONER

## 2024-05-07 PROCEDURE — 36415 COLL VENOUS BLD VENIPUNCTURE: CPT | Performed by: NURSE PRACTITIONER

## 2024-05-07 PROCEDURE — G8419 CALC BMI OUT NRM PARAM NOF/U: HCPCS | Performed by: NURSE PRACTITIONER

## 2024-05-07 RX ORDER — AMLODIPINE BESYLATE 5 MG/1
5 TABLET ORAL DAILY
Qty: 90 TABLET | Refills: 1 | Status: SHIPPED | OUTPATIENT
Start: 2024-05-07

## 2024-05-07 RX ORDER — ATORVASTATIN CALCIUM 40 MG/1
40 TABLET, FILM COATED ORAL NIGHTLY
Qty: 90 TABLET | Refills: 1 | Status: SHIPPED | OUTPATIENT
Start: 2024-05-07

## 2024-05-07 SDOH — ECONOMIC STABILITY: INCOME INSECURITY: HOW HARD IS IT FOR YOU TO PAY FOR THE VERY BASICS LIKE FOOD, HOUSING, MEDICAL CARE, AND HEATING?: NOT VERY HARD

## 2024-05-07 SDOH — ECONOMIC STABILITY: FOOD INSECURITY: WITHIN THE PAST 12 MONTHS, THE FOOD YOU BOUGHT JUST DIDN'T LAST AND YOU DIDN'T HAVE MONEY TO GET MORE.: NEVER TRUE

## 2024-05-07 SDOH — ECONOMIC STABILITY: HOUSING INSECURITY
IN THE LAST 12 MONTHS, WAS THERE A TIME WHEN YOU DID NOT HAVE A STEADY PLACE TO SLEEP OR SLEPT IN A SHELTER (INCLUDING NOW)?: NO

## 2024-05-07 SDOH — ECONOMIC STABILITY: FOOD INSECURITY: WITHIN THE PAST 12 MONTHS, YOU WORRIED THAT YOUR FOOD WOULD RUN OUT BEFORE YOU GOT MONEY TO BUY MORE.: NEVER TRUE

## 2024-05-07 ASSESSMENT — PATIENT HEALTH QUESTIONNAIRE - PHQ9
6. FEELING BAD ABOUT YOURSELF - OR THAT YOU ARE A FAILURE OR HAVE LET YOURSELF OR YOUR FAMILY DOWN: NOT AT ALL
SUM OF ALL RESPONSES TO PHQ QUESTIONS 1-9: 0
4. FEELING TIRED OR HAVING LITTLE ENERGY: NOT AT ALL
9. THOUGHTS THAT YOU WOULD BE BETTER OFF DEAD, OR OF HURTING YOURSELF: NOT AT ALL
SUM OF ALL RESPONSES TO PHQ QUESTIONS 1-9: 0
3. TROUBLE FALLING OR STAYING ASLEEP: NOT AT ALL
SUM OF ALL RESPONSES TO PHQ9 QUESTIONS 1 & 2: 0
7. TROUBLE CONCENTRATING ON THINGS, SUCH AS READING THE NEWSPAPER OR WATCHING TELEVISION: NOT AT ALL
SUM OF ALL RESPONSES TO PHQ QUESTIONS 1-9: 0
1. LITTLE INTEREST OR PLEASURE IN DOING THINGS: NOT AT ALL
SUM OF ALL RESPONSES TO PHQ QUESTIONS 1-9: 0
2. FEELING DOWN, DEPRESSED OR HOPELESS: NOT AT ALL
8. MOVING OR SPEAKING SO SLOWLY THAT OTHER PEOPLE COULD HAVE NOTICED. OR THE OPPOSITE, BEING SO FIGETY OR RESTLESS THAT YOU HAVE BEEN MOVING AROUND A LOT MORE THAN USUAL: NOT AT ALL
5. POOR APPETITE OR OVEREATING: NOT AT ALL
10. IF YOU CHECKED OFF ANY PROBLEMS, HOW DIFFICULT HAVE THESE PROBLEMS MADE IT FOR YOU TO DO YOUR WORK, TAKE CARE OF THINGS AT HOME, OR GET ALONG WITH OTHER PEOPLE: NOT DIFFICULT AT ALL

## 2024-05-07 ASSESSMENT — ENCOUNTER SYMPTOMS
EYE DISCHARGE: 0
ABDOMINAL DISTENTION: 0
APNEA: 0

## 2024-05-07 NOTE — PROGRESS NOTES
Marina Fletcher is a 73 y.o. female who presents today with c/o   Chief Complaint   Patient presents with    Follow-up     High blood pressure, high cholesterol, hemachromatosis, depression           Assessment/ Plan:       ASSESSMENT AND PLAN    Diagnoses:   HTN:   Check labs   Cont Amlodipine 5mg daily   Low-sodium diet   Exercise   RTO or go to ER for any CP, SOB, dizziness, or swelling.   F/U:  6 months      HLD:   Check labs   Cont Atorvastatin 40mg daily   Low fat diet   F/U: 6 months      Hemachromatosis   Check labs   Will place Phlebotomy order for her if needed  Does not wish to see hematologist   F/U: 6 months      Depression  Cont sertraline  F/U: 6 months      Orders Placed This Encounter   Procedures    CBC with Auto Differential     Standing Status:   Future     Number of Occurrences:   1     Standing Expiration Date:   5/7/2025    Comprehensive Metabolic Panel     Standing Status:   Future     Number of Occurrences:   1     Standing Expiration Date:   5/7/2025    Lipid Panel     Standing Status:   Future     Number of Occurrences:   1     Standing Expiration Date:   5/7/2025    Ferritin     Standing Status:   Future     Number of Occurrences:   1     Standing Expiration Date:   5/7/2025    TSH     Standing Status:   Future     Number of Occurrences:   1     Standing Expiration Date:   5/7/2025    MI COLLECTION VENOUS BLOOD VENIPUNCTURE     Return in about 6 months (around 11/7/2024) for medication follow up, blood pressure check, labs.       Subjective:  Marina Fletcher is a 73 y.o. female is here today for the following.    Hemachromatosis:   History of hemachromatosis. Reports she was diagnosed about\" years ago.\" States she had multiple phlebotomy treatments in the past, but has not needed any treatments in over 6 years. Ferritin 289 on 6/2023     She was previously referred to Dr. Mirza but never made an appointment. We referred her for a phlebotomy in the past, but she never went. Admits she \"isn't

## 2024-05-07 NOTE — PROGRESS NOTES
\"Have you been to the ER, urgent care clinic since your last visit?  Hospitalized since your last visit?\"    NO    “Have you seen or consulted any other health care providers outside of [facility] since your last visit?”    NO    Have you had a mammogram?”   NO    No breast cancer screening on file         “Have you had a colorectal cancer screening such as a colonoscopy/FIT/Cologuard?    NO    No colonoscopy on file  No cologuard on file  No FIT/FOBT on file   No flexible sigmoidoscopy on file         Click Here for Release of Records Request

## 2024-05-08 LAB
ALBUMIN SERPL-MCNC: 4 G/DL (ref 3.5–5)
ALBUMIN/GLOB SERPL: 1.3 (ref 1.1–2.2)
ALP SERPL-CCNC: 126 U/L (ref 45–117)
ALT SERPL-CCNC: 21 U/L (ref 12–78)
ANION GAP SERPL CALC-SCNC: 6 MMOL/L (ref 5–15)
AST SERPL-CCNC: 20 U/L (ref 15–37)
BASOPHILS # BLD: 0.1 K/UL (ref 0–0.1)
BASOPHILS NFR BLD: 1 % (ref 0–1)
BILIRUB SERPL-MCNC: 0.5 MG/DL (ref 0.2–1)
BUN SERPL-MCNC: 14 MG/DL (ref 6–20)
BUN/CREAT SERPL: 19 (ref 12–20)
CALCIUM SERPL-MCNC: 9.5 MG/DL (ref 8.5–10.1)
CHLORIDE SERPL-SCNC: 103 MMOL/L (ref 97–108)
CHOLEST SERPL-MCNC: 171 MG/DL
CO2 SERPL-SCNC: 27 MMOL/L (ref 21–32)
CREAT SERPL-MCNC: 0.74 MG/DL (ref 0.55–1.02)
DIFFERENTIAL METHOD BLD: ABNORMAL
EOSINOPHIL # BLD: 0 K/UL (ref 0–0.4)
EOSINOPHIL NFR BLD: 0 % (ref 0–7)
ERYTHROCYTE [DISTWIDTH] IN BLOOD BY AUTOMATED COUNT: 13.5 % (ref 11.5–14.5)
FERRITIN SERPL-MCNC: 202 NG/ML (ref 8–252)
GLOBULIN SER CALC-MCNC: 3.2 G/DL (ref 2–4)
GLUCOSE SERPL-MCNC: 112 MG/DL (ref 65–100)
HCT VFR BLD AUTO: 43.3 % (ref 35–47)
HDLC SERPL-MCNC: 92 MG/DL
HDLC SERPL: 1.9 (ref 0–5)
HGB BLD-MCNC: 15.1 G/DL (ref 11.5–16)
IMM GRANULOCYTES # BLD AUTO: 0 K/UL (ref 0–0.04)
IMM GRANULOCYTES NFR BLD AUTO: 0 % (ref 0–0.5)
LDLC SERPL CALC-MCNC: 64.2 MG/DL (ref 0–100)
LYMPHOCYTES # BLD: 2 K/UL (ref 0.8–3.5)
LYMPHOCYTES NFR BLD: 24 % (ref 12–49)
MCH RBC QN AUTO: 34.2 PG (ref 26–34)
MCHC RBC AUTO-ENTMCNC: 34.9 G/DL (ref 30–36.5)
MCV RBC AUTO: 98.2 FL (ref 80–99)
MONOCYTES # BLD: 0.9 K/UL (ref 0–1)
MONOCYTES NFR BLD: 10 % (ref 5–13)
NEUTS SEG # BLD: 5.6 K/UL (ref 1.8–8)
NEUTS SEG NFR BLD: 65 % (ref 32–75)
NRBC # BLD: 0 K/UL (ref 0–0.01)
NRBC BLD-RTO: 0 PER 100 WBC
PLATELET # BLD AUTO: 282 K/UL (ref 150–400)
PMV BLD AUTO: 9.7 FL (ref 8.9–12.9)
POTASSIUM SERPL-SCNC: 4.4 MMOL/L (ref 3.5–5.1)
PROT SERPL-MCNC: 7.2 G/DL (ref 6.4–8.2)
RBC # BLD AUTO: 4.41 M/UL (ref 3.8–5.2)
SODIUM SERPL-SCNC: 136 MMOL/L (ref 136–145)
TRIGL SERPL-MCNC: 74 MG/DL
TSH SERPL DL<=0.05 MIU/L-ACNC: 0.85 UIU/ML (ref 0.36–3.74)
VLDLC SERPL CALC-MCNC: 14.8 MG/DL
WBC # BLD AUTO: 8.5 K/UL (ref 3.6–11)

## 2024-08-15 DIAGNOSIS — F32.0 MAJOR DEPRESSIVE DISORDER, SINGLE EPISODE, MILD (HCC): ICD-10-CM

## 2024-11-12 ENCOUNTER — OFFICE VISIT (OUTPATIENT)
Age: 74
End: 2024-11-12
Payer: MEDICARE

## 2024-11-12 VITALS
SYSTOLIC BLOOD PRESSURE: 138 MMHG | HEIGHT: 65 IN | DIASTOLIC BLOOD PRESSURE: 56 MMHG | OXYGEN SATURATION: 92 % | RESPIRATION RATE: 16 BRPM | HEART RATE: 75 BPM | BODY MASS INDEX: 27.26 KG/M2 | WEIGHT: 163.6 LBS | TEMPERATURE: 98 F

## 2024-11-12 DIAGNOSIS — E83.118 OTHER HEMOCHROMATOSIS: ICD-10-CM

## 2024-11-12 DIAGNOSIS — I10 ESSENTIAL (PRIMARY) HYPERTENSION: ICD-10-CM

## 2024-11-12 DIAGNOSIS — F32.0 MAJOR DEPRESSIVE DISORDER, SINGLE EPISODE, MILD (HCC): ICD-10-CM

## 2024-11-12 DIAGNOSIS — R05.1 ACUTE COUGH: ICD-10-CM

## 2024-11-12 DIAGNOSIS — R73.09 ELEVATED GLUCOSE: ICD-10-CM

## 2024-11-12 DIAGNOSIS — E78.00 HYPERCHOLESTEROLEMIA: Primary | ICD-10-CM

## 2024-11-12 LAB
ALBUMIN SERPL-MCNC: 4.1 G/DL (ref 3.5–5)
ALBUMIN/GLOB SERPL: 1.3 (ref 1.1–2.2)
ALP SERPL-CCNC: 123 U/L (ref 45–117)
ALT SERPL-CCNC: 24 U/L (ref 12–78)
ANION GAP SERPL CALC-SCNC: 3 MMOL/L (ref 2–12)
AST SERPL-CCNC: 18 U/L (ref 15–37)
BASOPHILS # BLD: 0.1 K/UL (ref 0–0.1)
BASOPHILS NFR BLD: 1 % (ref 0–1)
BILIRUB SERPL-MCNC: 0.5 MG/DL (ref 0.2–1)
BUN SERPL-MCNC: 17 MG/DL (ref 6–20)
BUN/CREAT SERPL: 24 (ref 12–20)
CALCIUM SERPL-MCNC: 9.3 MG/DL (ref 8.5–10.1)
CHLORIDE SERPL-SCNC: 108 MMOL/L (ref 97–108)
CHOLEST SERPL-MCNC: 172 MG/DL
CO2 SERPL-SCNC: 27 MMOL/L (ref 21–32)
CREAT SERPL-MCNC: 0.72 MG/DL (ref 0.55–1.02)
DIFFERENTIAL METHOD BLD: ABNORMAL
EOSINOPHIL # BLD: 0 K/UL (ref 0–0.4)
EOSINOPHIL NFR BLD: 0 % (ref 0–7)
ERYTHROCYTE [DISTWIDTH] IN BLOOD BY AUTOMATED COUNT: 13 % (ref 11.5–14.5)
EST. AVERAGE GLUCOSE BLD GHB EST-MCNC: 103 MG/DL
FERRITIN SERPL-MCNC: 212 NG/ML (ref 8–252)
GLOBULIN SER CALC-MCNC: 3.2 G/DL (ref 2–4)
GLUCOSE SERPL-MCNC: 116 MG/DL (ref 65–100)
HBA1C MFR BLD: 5.2 % (ref 4–5.6)
HCT VFR BLD AUTO: 42.1 % (ref 35–47)
HDLC SERPL-MCNC: 103 MG/DL
HDLC SERPL: 1.7 (ref 0–5)
HGB BLD-MCNC: 14.3 G/DL (ref 11.5–16)
IMM GRANULOCYTES # BLD AUTO: 0 K/UL (ref 0–0.04)
IMM GRANULOCYTES NFR BLD AUTO: 0 % (ref 0–0.5)
LDLC SERPL CALC-MCNC: 57.4 MG/DL (ref 0–100)
LYMPHOCYTES # BLD: 2.5 K/UL (ref 0.8–3.5)
LYMPHOCYTES NFR BLD: 30 % (ref 12–49)
MCH RBC QN AUTO: 33.6 PG (ref 26–34)
MCHC RBC AUTO-ENTMCNC: 34 G/DL (ref 30–36.5)
MCV RBC AUTO: 99.1 FL (ref 80–99)
MONOCYTES # BLD: 0.8 K/UL (ref 0–1)
MONOCYTES NFR BLD: 10 % (ref 5–13)
NEUTS SEG # BLD: 4.8 K/UL (ref 1.8–8)
NEUTS SEG NFR BLD: 59 % (ref 32–75)
NRBC # BLD: 0 K/UL (ref 0–0.01)
NRBC BLD-RTO: 0 PER 100 WBC
PLATELET # BLD AUTO: 290 K/UL (ref 150–400)
PMV BLD AUTO: 9.5 FL (ref 8.9–12.9)
POTASSIUM SERPL-SCNC: 4.3 MMOL/L (ref 3.5–5.1)
PROT SERPL-MCNC: 7.3 G/DL (ref 6.4–8.2)
RBC # BLD AUTO: 4.25 M/UL (ref 3.8–5.2)
SODIUM SERPL-SCNC: 138 MMOL/L (ref 136–145)
TRIGL SERPL-MCNC: 58 MG/DL
TSH SERPL DL<=0.05 MIU/L-ACNC: 0.95 UIU/ML (ref 0.36–3.74)
VLDLC SERPL CALC-MCNC: 11.6 MG/DL
WBC # BLD AUTO: 8.3 K/UL (ref 3.6–11)

## 2024-11-12 PROCEDURE — 1159F MED LIST DOCD IN RCRD: CPT | Performed by: NURSE PRACTITIONER

## 2024-11-12 PROCEDURE — 1123F ACP DISCUSS/DSCN MKR DOCD: CPT | Performed by: NURSE PRACTITIONER

## 2024-11-12 PROCEDURE — 99214 OFFICE O/P EST MOD 30 MIN: CPT | Performed by: NURSE PRACTITIONER

## 2024-11-12 PROCEDURE — G8427 DOCREV CUR MEDS BY ELIG CLIN: HCPCS | Performed by: NURSE PRACTITIONER

## 2024-11-12 PROCEDURE — 3075F SYST BP GE 130 - 139MM HG: CPT | Performed by: NURSE PRACTITIONER

## 2024-11-12 PROCEDURE — G8400 PT W/DXA NO RESULTS DOC: HCPCS | Performed by: NURSE PRACTITIONER

## 2024-11-12 PROCEDURE — 3078F DIAST BP <80 MM HG: CPT | Performed by: NURSE PRACTITIONER

## 2024-11-12 PROCEDURE — 4004F PT TOBACCO SCREEN RCVD TLK: CPT | Performed by: NURSE PRACTITIONER

## 2024-11-12 PROCEDURE — 1160F RVW MEDS BY RX/DR IN RCRD: CPT | Performed by: NURSE PRACTITIONER

## 2024-11-12 PROCEDURE — 1090F PRES/ABSN URINE INCON ASSESS: CPT | Performed by: NURSE PRACTITIONER

## 2024-11-12 PROCEDURE — G8484 FLU IMMUNIZE NO ADMIN: HCPCS | Performed by: NURSE PRACTITIONER

## 2024-11-12 PROCEDURE — 36415 COLL VENOUS BLD VENIPUNCTURE: CPT | Performed by: NURSE PRACTITIONER

## 2024-11-12 PROCEDURE — 1126F AMNT PAIN NOTED NONE PRSNT: CPT | Performed by: NURSE PRACTITIONER

## 2024-11-12 PROCEDURE — 3017F COLORECTAL CA SCREEN DOC REV: CPT | Performed by: NURSE PRACTITIONER

## 2024-11-12 PROCEDURE — G8419 CALC BMI OUT NRM PARAM NOF/U: HCPCS | Performed by: NURSE PRACTITIONER

## 2024-11-12 ASSESSMENT — ENCOUNTER SYMPTOMS
EYE DISCHARGE: 0
ABDOMINAL DISTENTION: 0
APNEA: 0

## 2024-11-12 NOTE — PROGRESS NOTES
Marina Fletcher is a 74 y.o. female who presents today with c/o   Chief Complaint   Patient presents with    Follow-up     High blood pressure, hyperlipidemia, hemochromatosis, depression  Mild cough           Assessment/ Plan:       ASSESSMENT AND PLAN    Diagnoses:   HTN:   Check labs   Cont Amlodipine 5mg daily   Low-sodium diet   Exercise   RTO or go to ER for any CP, SOB, dizziness, or swelling.   F/U:  6 months      HLD:   Check labs   Cont Atorvastatin 40mg daily   Low fat diet   F/U: 6 months        Hemachromatosis   Check labs   Will place Phlebotomy order for her if needed  Does not wish to see hematologist   F/U: 6 months      Depression  Cont sertraline  F/U: 6 months    Elevated glucose on chart review  Add A1C    Cough  The nurse said the patient was coughing today. The patient tells me it feels like allergies. She declined a covid test. Said she feels fine and she will RTO if she has any problems    Orders Placed This Encounter   Procedures    COLLECTION VENOUS BLOOD,VENIPUNCTURE    CBC with Auto Differential     Standing Status:   Future     Number of Occurrences:   1     Standing Expiration Date:   11/12/2025    Comprehensive Metabolic Panel     Standing Status:   Future     Number of Occurrences:   1     Standing Expiration Date:   11/12/2025    Lipid Panel     Standing Status:   Future     Number of Occurrences:   1     Standing Expiration Date:   11/12/2025    Ferritin     Standing Status:   Future     Number of Occurrences:   1     Standing Expiration Date:   11/12/2025    TSH     Standing Status:   Future     Number of Occurrences:   1     Standing Expiration Date:   11/12/2025    Hemoglobin A1C     Standing Status:   Future     Number of Occurrences:   1     Standing Expiration Date:   11/12/2025     Return in about 1 year (around 11/12/2025) for medication follow up, blood pressure check, labs.     Patient requested one year follow up. If labs are stable then I advised her that would be

## 2024-11-12 NOTE — PROGRESS NOTES
Chief Complaint   Patient presents with    Cough       Vitals:    11/12/24 1130   BP: (!) 138/56   Pulse: 75   Resp: 16   Temp: 98 °F (36.7 °C)   SpO2: 92%   \"Have you been to the ER, urgent care clinic since your last visit?  Hospitalized since your last visit?\"    NO    “Have you seen or consulted any other health care providers outside our system since your last visit?”    NO    Have you had a mammogram?”   NO    No breast cancer screening on file       “Have you had a colorectal cancer screening such as a colonoscopy/FIT/Cologuard?    NO    No colonoscopy on file  No cologuard on file  No FIT/FOBT on file   No flexible sigmoidoscopy on file

## 2024-12-20 DIAGNOSIS — I10 ESSENTIAL (PRIMARY) HYPERTENSION: ICD-10-CM

## 2024-12-20 DIAGNOSIS — E78.00 HYPERCHOLESTEROLEMIA: ICD-10-CM

## 2024-12-23 RX ORDER — AMLODIPINE BESYLATE 5 MG/1
5 TABLET ORAL DAILY
Qty: 90 TABLET | Refills: 1 | Status: SHIPPED | OUTPATIENT
Start: 2024-12-23

## 2024-12-23 RX ORDER — ATORVASTATIN CALCIUM 40 MG/1
40 TABLET, FILM COATED ORAL NIGHTLY
Qty: 90 TABLET | Refills: 1 | Status: SHIPPED | OUTPATIENT
Start: 2024-12-23

## 2025-06-09 DIAGNOSIS — F32.0 MAJOR DEPRESSIVE DISORDER, SINGLE EPISODE, MILD: ICD-10-CM

## 2025-06-09 NOTE — TELEPHONE ENCOUNTER
Patient requesting refill on     Requested Prescriptions     Pending Prescriptions Disp Refills    sertraline (ZOLOFT) 50 MG tablet [Pharmacy Med Name: SERTRALINE HCL 50 MG TABLET] 90 tablet 1     Sig: TAKE 1 TABLET BY MOUTH EVERY DAY        Last OV 11/12/2024

## 2025-06-13 DIAGNOSIS — E78.00 HYPERCHOLESTEROLEMIA: ICD-10-CM

## 2025-06-13 RX ORDER — ATORVASTATIN CALCIUM 40 MG/1
40 TABLET, FILM COATED ORAL NIGHTLY
Qty: 90 TABLET | Refills: 1 | Status: SHIPPED | OUTPATIENT
Start: 2025-06-13

## 2025-06-19 DIAGNOSIS — I10 ESSENTIAL (PRIMARY) HYPERTENSION: ICD-10-CM

## 2025-06-19 RX ORDER — AMLODIPINE BESYLATE 5 MG/1
5 TABLET ORAL DAILY
Qty: 90 TABLET | Refills: 1 | Status: SHIPPED | OUTPATIENT
Start: 2025-06-19

## 2025-06-19 NOTE — TELEPHONE ENCOUNTER
Patient requesting refill on     Requested Prescriptions     Pending Prescriptions Disp Refills    amLODIPine (NORVASC) 5 MG tablet [Pharmacy Med Name: AMLODIPINE BESYLATE 5 MG TAB] 90 tablet 1     Sig: TAKE 1 TABLET BY MOUTH EVERY DAY        Last OV 11/12/2024